# Patient Record
Sex: FEMALE | Race: WHITE | NOT HISPANIC OR LATINO | URBAN - METROPOLITAN AREA
[De-identification: names, ages, dates, MRNs, and addresses within clinical notes are randomized per-mention and may not be internally consistent; named-entity substitution may affect disease eponyms.]

---

## 2018-09-14 ENCOUNTER — INPATIENT (INPATIENT)
Facility: HOSPITAL | Age: 33
LOS: 8 days | Discharge: HOME | End: 2018-09-23
Attending: THORACIC SURGERY (CARDIOTHORACIC VASCULAR SURGERY) | Admitting: THORACIC SURGERY (CARDIOTHORACIC VASCULAR SURGERY)

## 2018-09-14 ENCOUNTER — OUTPATIENT (OUTPATIENT)
Dept: OUTPATIENT SERVICES | Facility: HOSPITAL | Age: 33
LOS: 1 days | Discharge: HOME | End: 2018-09-14

## 2018-09-14 VITALS
SYSTOLIC BLOOD PRESSURE: 137 MMHG | DIASTOLIC BLOOD PRESSURE: 86 MMHG | HEART RATE: 120 BPM | OXYGEN SATURATION: 99 % | RESPIRATION RATE: 20 BRPM | TEMPERATURE: 97 F

## 2018-09-14 DIAGNOSIS — Z98.891 HISTORY OF UTERINE SCAR FROM PREVIOUS SURGERY: Chronic | ICD-10-CM

## 2018-09-14 DIAGNOSIS — R06.9 UNSPECIFIED ABNORMALITIES OF BREATHING: ICD-10-CM

## 2018-09-14 LAB
ALBUMIN SERPL ELPH-MCNC: 4.8 G/DL — SIGNIFICANT CHANGE UP (ref 3.5–5.2)
ALP SERPL-CCNC: 43 U/L — SIGNIFICANT CHANGE UP (ref 30–115)
ALT FLD-CCNC: 10 U/L — SIGNIFICANT CHANGE UP (ref 0–41)
ANION GAP SERPL CALC-SCNC: 17 MMOL/L — HIGH (ref 7–14)
APTT BLD: 41.9 SEC — HIGH (ref 27–39.2)
AST SERPL-CCNC: 32 U/L — SIGNIFICANT CHANGE UP (ref 0–41)
BASE EXCESS BLDV CALC-SCNC: 0.9 MMOL/L — SIGNIFICANT CHANGE UP (ref -2–2)
BASOPHILS # BLD AUTO: 0.04 K/UL — SIGNIFICANT CHANGE UP (ref 0–0.2)
BASOPHILS NFR BLD AUTO: 0.5 % — SIGNIFICANT CHANGE UP (ref 0–1)
BILIRUB SERPL-MCNC: 0.3 MG/DL — SIGNIFICANT CHANGE UP (ref 0.2–1.2)
BLD GP AB SCN SERPL QL: SIGNIFICANT CHANGE UP
BUN SERPL-MCNC: 18 MG/DL — SIGNIFICANT CHANGE UP (ref 10–20)
CA-I SERPL-SCNC: 1.22 MMOL/L — SIGNIFICANT CHANGE UP (ref 1.12–1.3)
CALCIUM SERPL-MCNC: 9 MG/DL — SIGNIFICANT CHANGE UP (ref 8.5–10.1)
CHLORIDE SERPL-SCNC: 99 MMOL/L — SIGNIFICANT CHANGE UP (ref 98–110)
CO2 SERPL-SCNC: 21 MMOL/L — SIGNIFICANT CHANGE UP (ref 17–32)
CREAT SERPL-MCNC: 0.8 MG/DL — SIGNIFICANT CHANGE UP (ref 0.7–1.5)
EOSINOPHIL # BLD AUTO: 0.09 K/UL — SIGNIFICANT CHANGE UP (ref 0–0.7)
EOSINOPHIL NFR BLD AUTO: 1.2 % — SIGNIFICANT CHANGE UP (ref 0–8)
GAS PNL BLDV: 139 MMOL/L — SIGNIFICANT CHANGE UP (ref 136–145)
GAS PNL BLDV: SIGNIFICANT CHANGE UP
GLUCOSE SERPL-MCNC: 99 MG/DL — SIGNIFICANT CHANGE UP (ref 70–99)
HCO3 BLDV-SCNC: 28 MMOL/L — SIGNIFICANT CHANGE UP (ref 22–29)
HCT VFR BLD CALC: 39.6 % — SIGNIFICANT CHANGE UP (ref 37–47)
HCT VFR BLDA CALC: 41.7 % — SIGNIFICANT CHANGE UP (ref 34–44)
HGB BLD CALC-MCNC: 13.6 G/DL — LOW (ref 14–18)
HGB BLD-MCNC: 12.7 G/DL — SIGNIFICANT CHANGE UP (ref 12–16)
IMM GRANULOCYTES NFR BLD AUTO: 0.3 % — SIGNIFICANT CHANGE UP (ref 0.1–0.3)
INR BLD: 1.13 RATIO — SIGNIFICANT CHANGE UP (ref 0.65–1.3)
LACTATE BLDV-MCNC: 0.7 MMOL/L — SIGNIFICANT CHANGE UP (ref 0.5–1.6)
LYMPHOCYTES # BLD AUTO: 2.11 K/UL — SIGNIFICANT CHANGE UP (ref 1.2–3.4)
LYMPHOCYTES # BLD AUTO: 28.8 % — SIGNIFICANT CHANGE UP (ref 20.5–51.1)
MCHC RBC-ENTMCNC: 26.7 PG — LOW (ref 27–31)
MCHC RBC-ENTMCNC: 32.1 G/DL — SIGNIFICANT CHANGE UP (ref 32–37)
MCV RBC AUTO: 83.4 FL — SIGNIFICANT CHANGE UP (ref 81–99)
MONOCYTES # BLD AUTO: 0.56 K/UL — SIGNIFICANT CHANGE UP (ref 0.1–0.6)
MONOCYTES NFR BLD AUTO: 7.6 % — SIGNIFICANT CHANGE UP (ref 1.7–9.3)
NEUTROPHILS # BLD AUTO: 4.51 K/UL — SIGNIFICANT CHANGE UP (ref 1.4–6.5)
NEUTROPHILS NFR BLD AUTO: 61.6 % — SIGNIFICANT CHANGE UP (ref 42.2–75.2)
NRBC # BLD: 0 /100 WBCS — SIGNIFICANT CHANGE UP (ref 0–0)
PCO2 BLDV: 51 MMHG — SIGNIFICANT CHANGE UP (ref 41–51)
PH BLDV: 7.34 — SIGNIFICANT CHANGE UP (ref 7.26–7.43)
PLATELET # BLD AUTO: 262 K/UL — SIGNIFICANT CHANGE UP (ref 130–400)
PO2 BLDV: 18 MMHG — LOW (ref 20–40)
POTASSIUM BLDV-SCNC: 3.9 MMOL/L — SIGNIFICANT CHANGE UP (ref 3.3–5.6)
POTASSIUM SERPL-MCNC: 5.5 MMOL/L — HIGH (ref 3.5–5)
POTASSIUM SERPL-SCNC: 5.5 MMOL/L — HIGH (ref 3.5–5)
PROT SERPL-MCNC: 8.1 G/DL — HIGH (ref 6–8)
PROTHROM AB SERPL-ACNC: 12.2 SEC — SIGNIFICANT CHANGE UP (ref 9.95–12.87)
RBC # BLD: 4.75 M/UL — SIGNIFICANT CHANGE UP (ref 4.2–5.4)
RBC # FLD: 13 % — SIGNIFICANT CHANGE UP (ref 11.5–14.5)
SAO2 % BLDV: 21 % — SIGNIFICANT CHANGE UP
SODIUM SERPL-SCNC: 137 MMOL/L — SIGNIFICANT CHANGE UP (ref 135–146)
TYPE + AB SCN PNL BLD: SIGNIFICANT CHANGE UP
WBC # BLD: 7.33 K/UL — SIGNIFICANT CHANGE UP (ref 4.8–10.8)
WBC # FLD AUTO: 7.33 K/UL — SIGNIFICANT CHANGE UP (ref 4.8–10.8)

## 2018-09-14 RX ORDER — IBUPROFEN 200 MG
400 TABLET ORAL EVERY 6 HOURS
Qty: 0 | Refills: 0 | Status: DISCONTINUED | OUTPATIENT
Start: 2018-09-14 | End: 2018-09-15

## 2018-09-14 RX ORDER — ACETAMINOPHEN 500 MG
650 TABLET ORAL EVERY 6 HOURS
Qty: 0 | Refills: 0 | Status: DISCONTINUED | OUTPATIENT
Start: 2018-09-14 | End: 2018-09-15

## 2018-09-14 RX ORDER — LEVOTHYROXINE SODIUM 125 MCG
100 TABLET ORAL DAILY
Qty: 0 | Refills: 0 | Status: DISCONTINUED | OUTPATIENT
Start: 2018-09-14 | End: 2018-09-18

## 2018-09-14 RX ORDER — CHLORHEXIDINE GLUCONATE 213 G/1000ML
1 SOLUTION TOPICAL
Qty: 0 | Refills: 0 | Status: DISCONTINUED | OUTPATIENT
Start: 2018-09-14 | End: 2018-09-18

## 2018-09-14 RX ORDER — HEPARIN SODIUM 5000 [USP'U]/ML
5000 INJECTION INTRAVENOUS; SUBCUTANEOUS EVERY 8 HOURS
Qty: 0 | Refills: 0 | Status: DISCONTINUED | OUTPATIENT
Start: 2018-09-14 | End: 2018-09-16

## 2018-09-14 RX ORDER — PANTOPRAZOLE SODIUM 20 MG/1
40 TABLET, DELAYED RELEASE ORAL
Qty: 0 | Refills: 0 | Status: DISCONTINUED | OUTPATIENT
Start: 2018-09-14 | End: 2018-09-18

## 2018-09-14 NOTE — ED PROVIDER NOTE - MEDICAL DECISION MAKING DETAILS
33 female here for eval of chest pain found to have outpatient CXR with PTX.  Got labs imaging and consult to Thoracic Surgery will admit for continued management.

## 2018-09-14 NOTE — H&P ADULT - NSHPLABSRESULTS_GEN_ALL_CORE
Labs:                        12.7   7.33  )-----------( 262      ( 14 Sep 2018 20:00 )             39.6       Auto Neutrophil %: 61.6 % (09-14-18 @ 20:00)  Auto Immature Granulocyte %: 0.3 % (09-14-18 @ 20:00)    09-14    137  |  99  |  18  ----------------------------<  99  5.5<H>   |  21  |  0.8      Calcium, Total Serum: 9.0 mg/dL (09-14-18 @ 20:00)      LFTs:             8.1  | 0.3  | 32       ------------------[43      ( 14 Sep 2018 20:00 )  4.8  | x    | 10             Blood Gas Venous - Lactate: 0.7 mmoL/L (09-14-18 @ 19:57)    Coags:     12.20  ----< 1.13    ( 14 Sep 2018 20:00 )     41.9      < from: Xray Chest 2 Views PA/Lat (09.14.18 @ 18:32) >    IMPRESSION:      Large right pneumothorax.    < end of copied text >

## 2018-09-14 NOTE — ED PROVIDER NOTE - ATTENDING CONTRIBUTION TO CARE
I personally evaluated the patient. I reviewed the Resident’s or Physician Assistant’s note (as assigned above), and agree with the findings and plan except as documented in my note.    33 female here for chest pain began abruptly 2 days ago approximately 7pm. Non traumatic. No other injuries. ROS + for exertional dyspnea. Sent to ED by a local [facility] for pneumothorax after abnormal XR. No prior history.      PE: female in no distress. CHEST: decreased breath sounds on the right. bedside POCUS reveals no lung sliding. Normal work of breathing. NECK: normal trachea. CV: pulses intact.     Impression: pneumothorax    Plan: IV labs imaging supportive care consult thoracic surgery tube thoracostomy and admission

## 2018-09-14 NOTE — ED PROVIDER NOTE - OBJECTIVE STATEMENT
33 y.o. F with no PMH of thyroid and c section presents with chest pain since last night. 33 y.o. F with no PMH of thyroid and c section presents with chest pain since last night. She denies trauma, denies drug use. She woke up to chest pain and shortness of breath.

## 2018-09-14 NOTE — ED PROVIDER NOTE - PHYSICAL EXAMINATION
CONSTITUTIONAL: Well-developed; well-nourished; in no acute distress.   SKIN: warm, dry  HEAD: Normocephalic; atraumatic.  NECK: Supple; non tender.  CARD: S1, S2 normal; no murmurs, gallops, or rubs. Regular rate and rhythm.   RESP: No air movement in right side of the lung, No wheezes, rales or rhonchi.  ABD: soft ntnd  EXT: Normal ROM.  No clubbing, cyanosis or edema.   NEURO: Alert, oriented, grossly unremarkable  Psych: Cooperative, able to communicate wishes.

## 2018-09-14 NOTE — H&P ADULT - ASSESSMENT
33 year old female with large ideopathic right pneumothorax now s/p pigtail placed in ED with resolved pneumothorax    Plan  - Admit to Thoracic surgery  - f/u CXR  - Pigtail to suction

## 2018-09-14 NOTE — H&P ADULT - NSHPPHYSICALEXAM_GEN_ALL_CORE
PHYSICAL EXAM:  GENERAL: NAD  HEENT: NCAT  CHEST/LUNG: Diminished breath sounds on right side, no incread WOB,   HEART: Regular rate and rhythm  ABDOMEN: Soft, Nondistended, Nontender, Pelvis stable  EXTREMITIES:  Moving all extremities, pulses throughout, no deformities

## 2018-09-14 NOTE — H&P ADULT - ATTENDING COMMENTS
General Thoracic Surgery Attestation    I have seen and examined the patient.  Where appropriate I have updated, edited, or corrected the resident's or PA's note with regard to findings, values, and plan.    first time presentation of right spontaneous pneumothorax.  will plan to manage conservatively with chest tube.  patient requested small bore pigtail catheter, my recommendation was for formal thoracostomy.  patient is aware that these tubes may need to be replaced with a formal thoracostomy in the future, if unable to maintain total pulmonary expansion, clogging occurs, etc.

## 2018-09-14 NOTE — H&P ADULT - HISTORY OF PRESENT ILLNESS
Patient is a 32 yo female with no PMH presented to the ED with 2 days of chest pain. She states that after dinner last night she was driving and began having chest pain that did not go away. She was able to work her normal shift at work prior to this and had no problems. As the day progressed she began having more SOB as well. She denies any dyspnea, or trauma.

## 2018-09-14 NOTE — ED PROVIDER NOTE - NOTES
Plan discussued, prefers full thoracostomy.  Shared decision making made with explanation of choices by me and Dr. Babb bedside. Risks and benefits of pigtail vs. tube thoracostomy presented to patient.

## 2018-09-14 NOTE — ED PROCEDURE NOTE - PROCEDURE ADDITIONAL DETAILS
Pt tolerated procedure well. On initial Xray the chest tube was in deeper than desired. 8 cm was pulled out and CXR was repeated for proper position.

## 2018-09-14 NOTE — ED PROVIDER NOTE - PROGRESS NOTE DETAILS
patient has been clinically well, on high concentration oxygen and not hypoxic when given room air trial.  Not short of breath.  Anxious regarding procedure and wants to wait for her  to arrive before initiating procedure. Pt opted to for a pigtail

## 2018-09-14 NOTE — ED PROVIDER NOTE - NS ED ROS FT
General: No fevers, no chills  ENMT:  No hearing changes, pain, no sore throat or runny nose, no difficulty swallowing  Cardiac:  No chest pain, SOB or edema. No chest pain with exertion.  Respiratory:  No cough or respiratory distress. No hemoptysis. No history of asthma or RAD.  :  No dysuria, frequency or burning.  MS:  No myalgia, muscle weakness, joint pain or back pain.  Neuro:  No headache or weakness.  No LOC.  Skin:  No skin rash.   Endocrine: No history of thyroid disease or diabetes.

## 2018-09-15 LAB
ANION GAP SERPL CALC-SCNC: 9 MMOL/L — SIGNIFICANT CHANGE UP (ref 7–14)
BASOPHILS # BLD AUTO: 0.03 K/UL — SIGNIFICANT CHANGE UP (ref 0–0.2)
BASOPHILS NFR BLD AUTO: 0.7 % — SIGNIFICANT CHANGE UP (ref 0–1)
BUN SERPL-MCNC: 11 MG/DL — SIGNIFICANT CHANGE UP (ref 10–20)
CALCIUM SERPL-MCNC: 9 MG/DL — SIGNIFICANT CHANGE UP (ref 8.5–10.1)
CHLORIDE SERPL-SCNC: 105 MMOL/L — SIGNIFICANT CHANGE UP (ref 98–110)
CO2 SERPL-SCNC: 26 MMOL/L — SIGNIFICANT CHANGE UP (ref 17–32)
CREAT SERPL-MCNC: 0.7 MG/DL — SIGNIFICANT CHANGE UP (ref 0.7–1.5)
EOSINOPHIL # BLD AUTO: 0.04 K/UL — SIGNIFICANT CHANGE UP (ref 0–0.7)
EOSINOPHIL NFR BLD AUTO: 0.9 % — SIGNIFICANT CHANGE UP (ref 0–8)
GLUCOSE SERPL-MCNC: 96 MG/DL — SIGNIFICANT CHANGE UP (ref 70–99)
HCT VFR BLD CALC: 35.1 % — LOW (ref 37–47)
HGB BLD-MCNC: 11.4 G/DL — LOW (ref 12–16)
IMM GRANULOCYTES NFR BLD AUTO: 0.2 % — SIGNIFICANT CHANGE UP (ref 0.1–0.3)
LYMPHOCYTES # BLD AUTO: 0.85 K/UL — LOW (ref 1.2–3.4)
LYMPHOCYTES # BLD AUTO: 18.7 % — LOW (ref 20.5–51.1)
MAGNESIUM SERPL-MCNC: 2 MG/DL — SIGNIFICANT CHANGE UP (ref 1.8–2.4)
MCHC RBC-ENTMCNC: 27.3 PG — SIGNIFICANT CHANGE UP (ref 27–31)
MCHC RBC-ENTMCNC: 32.5 G/DL — SIGNIFICANT CHANGE UP (ref 32–37)
MCV RBC AUTO: 84 FL — SIGNIFICANT CHANGE UP (ref 81–99)
MONOCYTES # BLD AUTO: 0.31 K/UL — SIGNIFICANT CHANGE UP (ref 0.1–0.6)
MONOCYTES NFR BLD AUTO: 6.8 % — SIGNIFICANT CHANGE UP (ref 1.7–9.3)
NEUTROPHILS # BLD AUTO: 3.3 K/UL — SIGNIFICANT CHANGE UP (ref 1.4–6.5)
NEUTROPHILS NFR BLD AUTO: 72.7 % — SIGNIFICANT CHANGE UP (ref 42.2–75.2)
NRBC # BLD: 0 /100 WBCS — SIGNIFICANT CHANGE UP (ref 0–0)
PHOSPHATE SERPL-MCNC: 2.7 MG/DL — SIGNIFICANT CHANGE UP (ref 2.1–4.9)
PLATELET # BLD AUTO: 201 K/UL — SIGNIFICANT CHANGE UP (ref 130–400)
POTASSIUM SERPL-MCNC: 4.1 MMOL/L — SIGNIFICANT CHANGE UP (ref 3.5–5)
POTASSIUM SERPL-SCNC: 4.1 MMOL/L — SIGNIFICANT CHANGE UP (ref 3.5–5)
RBC # BLD: 4.18 M/UL — LOW (ref 4.2–5.4)
RBC # FLD: 12.9 % — SIGNIFICANT CHANGE UP (ref 11.5–14.5)
SODIUM SERPL-SCNC: 140 MMOL/L — SIGNIFICANT CHANGE UP (ref 135–146)
WBC # BLD: 4.54 K/UL — LOW (ref 4.8–10.8)
WBC # FLD AUTO: 4.54 K/UL — LOW (ref 4.8–10.8)

## 2018-09-15 RX ORDER — ACETAMINOPHEN WITH CODEINE 300MG-30MG
1 TABLET ORAL EVERY 4 HOURS
Qty: 0 | Refills: 0 | Status: DISCONTINUED | OUTPATIENT
Start: 2018-09-15 | End: 2018-09-18

## 2018-09-15 RX ADMIN — Medication 650 MILLIGRAM(S): at 07:38

## 2018-09-15 RX ADMIN — Medication 400 MILLIGRAM(S): at 06:04

## 2018-09-15 RX ADMIN — Medication 1 TABLET(S): at 18:36

## 2018-09-15 RX ADMIN — HEPARIN SODIUM 5000 UNIT(S): 5000 INJECTION INTRAVENOUS; SUBCUTANEOUS at 22:48

## 2018-09-15 RX ADMIN — PANTOPRAZOLE SODIUM 40 MILLIGRAM(S): 20 TABLET, DELAYED RELEASE ORAL at 06:03

## 2018-09-15 RX ADMIN — Medication 400 MILLIGRAM(S): at 06:03

## 2018-09-15 RX ADMIN — Medication 400 MILLIGRAM(S): at 03:54

## 2018-09-15 RX ADMIN — CHLORHEXIDINE GLUCONATE 1 APPLICATION(S): 213 SOLUTION TOPICAL at 06:05

## 2018-09-15 RX ADMIN — Medication 100 MICROGRAM(S): at 07:38

## 2018-09-15 RX ADMIN — Medication 650 MILLIGRAM(S): at 00:48

## 2018-09-15 RX ADMIN — Medication 650 MILLIGRAM(S): at 03:53

## 2018-09-15 RX ADMIN — Medication 1 TABLET(S): at 23:48

## 2018-09-15 RX ADMIN — Medication 650 MILLIGRAM(S): at 06:03

## 2018-09-15 RX ADMIN — Medication 1 TABLET(S): at 23:05

## 2018-09-15 RX ADMIN — HEPARIN SODIUM 5000 UNIT(S): 5000 INJECTION INTRAVENOUS; SUBCUTANEOUS at 06:04

## 2018-09-15 RX ADMIN — Medication 400 MILLIGRAM(S): at 07:38

## 2018-09-15 RX ADMIN — Medication 1 TABLET(S): at 11:41

## 2018-09-15 NOTE — PROGRESS NOTE ADULT - SUBJECTIVE AND OBJECTIVE BOX
GENERAL SURGERY PROGRESS NOTE     EDITH CAPONE  33y  Female  Hospital day :1d  POD:  Procedure: Right pigtail placement  OVERNIGHT EVENTS: No overnight events.    T(F): 97.8 (09-14-18 @ 23:49), Max: 97.8 (09-14-18 @ 23:49)  HR: 92 (09-14-18 @ 23:49) (92 - 120)  BP: 121/76 (09-14-18 @ 23:49) (121/76 - 137/86)  ABP: --  ABP(mean): --  RR: 18 (09-14-18 @ 23:49) (18 - 20)  SpO2: 99% (09-14-18 @ 23:49) (99% - 99%)    GI proph:  pantoprazole    Tablet 40 milliGRAM(s) Oral before breakfast    AC/ proph: heparin  Injectable 5000 Unit(s) SubCutaneous every 8 hours    ABx:   GENERAL: NAD  	HEENT: NCAT  	CHEST/LUNG: Right sided pigtail in place.  	HEART: Regular rate and rhythm  	ABDOMEN: Soft, Nondistended, Nontender, Pelvis stable  EXTREMITIES:  Moving all extremities, pulses throughout, no deformities    LABS  Labs:  CAPILLARY BLOOD GLUCOSE                        12.7   7.33  )-----------( 262      ( 14 Sep 2018 20:00 )             39.6       Auto Neutrophil %: 61.6 % (09-14-18 @ 20:00)  Auto Immature Granulocyte %: 0.3 % (09-14-18 @ 20:00)    09-14    137  |  99  |  18  ----------------------------<  99  5.5<H>   |  21  |  0.8      Calcium, Total Serum: 9.0 mg/dL (09-14-18 @ 20:00)      LFTs:             8.1  | 0.3  | 32       ------------------[43      ( 14 Sep 2018 20:00 )  4.8  | x    | 10          Lipase:x      Amylase:x         Blood Gas Venous - Lactate: 0.7 mmoL/L (09-14-18 @ 19:57)      Coags:     12.20  ----< 1.13    ( 14 Sep 2018 20:00 )     41.9      RADIOLOGY & ADDITIONAL TESTS:    < from: Xray Chest 2 Views PA/Lat (09.14.18 @ 18:32) >  IMPRESSION:      Large rightpneumothorax.        < end of copied text >

## 2018-09-15 NOTE — PROVIDER CONTACT NOTE (OTHER) - SITUATION
pt complaining of pain of R side of chest (insertion of pigtail drain) with tenderness to area with complaints of SOB, VSS BP - 98/963 HR 84 pulse ox 100% on RA, MD came to bedside to assess pt and

## 2018-09-15 NOTE — PROGRESS NOTE ADULT - ASSESSMENT
33 year old female s/p right pigtail placement  -pigtail to suction  -RD. IVL  -Continue home meds.  -Pain control

## 2018-09-16 LAB
ANION GAP SERPL CALC-SCNC: 11 MMOL/L — SIGNIFICANT CHANGE UP (ref 7–14)
BASOPHILS # BLD AUTO: 0.03 K/UL — SIGNIFICANT CHANGE UP (ref 0–0.2)
BASOPHILS NFR BLD AUTO: 0.6 % — SIGNIFICANT CHANGE UP (ref 0–1)
BUN SERPL-MCNC: 13 MG/DL — SIGNIFICANT CHANGE UP (ref 10–20)
CALCIUM SERPL-MCNC: 8.7 MG/DL — SIGNIFICANT CHANGE UP (ref 8.5–10.1)
CHLORIDE SERPL-SCNC: 104 MMOL/L — SIGNIFICANT CHANGE UP (ref 98–110)
CO2 SERPL-SCNC: 24 MMOL/L — SIGNIFICANT CHANGE UP (ref 17–32)
CREAT SERPL-MCNC: 0.7 MG/DL — SIGNIFICANT CHANGE UP (ref 0.7–1.5)
EOSINOPHIL # BLD AUTO: 0.13 K/UL — SIGNIFICANT CHANGE UP (ref 0–0.7)
EOSINOPHIL NFR BLD AUTO: 2.6 % — SIGNIFICANT CHANGE UP (ref 0–8)
GLUCOSE SERPL-MCNC: 91 MG/DL — SIGNIFICANT CHANGE UP (ref 70–99)
HCT VFR BLD CALC: 33.6 % — LOW (ref 37–47)
HGB BLD-MCNC: 10.8 G/DL — LOW (ref 12–16)
IMM GRANULOCYTES NFR BLD AUTO: 0.2 % — SIGNIFICANT CHANGE UP (ref 0.1–0.3)
LYMPHOCYTES # BLD AUTO: 1.91 K/UL — SIGNIFICANT CHANGE UP (ref 1.2–3.4)
LYMPHOCYTES # BLD AUTO: 38.2 % — SIGNIFICANT CHANGE UP (ref 20.5–51.1)
MAGNESIUM SERPL-MCNC: 1.9 MG/DL — SIGNIFICANT CHANGE UP (ref 1.8–2.4)
MCHC RBC-ENTMCNC: 26.9 PG — LOW (ref 27–31)
MCHC RBC-ENTMCNC: 32.1 G/DL — SIGNIFICANT CHANGE UP (ref 32–37)
MCV RBC AUTO: 83.6 FL — SIGNIFICANT CHANGE UP (ref 81–99)
MONOCYTES # BLD AUTO: 0.59 K/UL — SIGNIFICANT CHANGE UP (ref 0.1–0.6)
MONOCYTES NFR BLD AUTO: 11.8 % — HIGH (ref 1.7–9.3)
NEUTROPHILS # BLD AUTO: 2.33 K/UL — SIGNIFICANT CHANGE UP (ref 1.4–6.5)
NEUTROPHILS NFR BLD AUTO: 46.6 % — SIGNIFICANT CHANGE UP (ref 42.2–75.2)
NRBC # BLD: 0 /100 WBCS — SIGNIFICANT CHANGE UP (ref 0–0)
PHOSPHATE SERPL-MCNC: 3.4 MG/DL — SIGNIFICANT CHANGE UP (ref 2.1–4.9)
PLATELET # BLD AUTO: 195 K/UL — SIGNIFICANT CHANGE UP (ref 130–400)
POTASSIUM SERPL-MCNC: 4 MMOL/L — SIGNIFICANT CHANGE UP (ref 3.5–5)
POTASSIUM SERPL-SCNC: 4 MMOL/L — SIGNIFICANT CHANGE UP (ref 3.5–5)
RBC # BLD: 4.02 M/UL — LOW (ref 4.2–5.4)
RBC # FLD: 12.9 % — SIGNIFICANT CHANGE UP (ref 11.5–14.5)
SODIUM SERPL-SCNC: 139 MMOL/L — SIGNIFICANT CHANGE UP (ref 135–146)
WBC # BLD: 5 K/UL — SIGNIFICANT CHANGE UP (ref 4.8–10.8)
WBC # FLD AUTO: 5 K/UL — SIGNIFICANT CHANGE UP (ref 4.8–10.8)

## 2018-09-16 RX ORDER — LIDOCAINE 4 G/100G
1 CREAM TOPICAL DAILY
Qty: 0 | Refills: 0 | Status: DISCONTINUED | OUTPATIENT
Start: 2018-09-16 | End: 2018-09-18

## 2018-09-16 RX ORDER — HEPARIN SODIUM 5000 [USP'U]/ML
5000 INJECTION INTRAVENOUS; SUBCUTANEOUS EVERY 12 HOURS
Qty: 0 | Refills: 0 | Status: DISCONTINUED | OUTPATIENT
Start: 2018-09-16 | End: 2018-09-18

## 2018-09-16 RX ADMIN — Medication 100 MICROGRAM(S): at 06:23

## 2018-09-16 RX ADMIN — HEPARIN SODIUM 5000 UNIT(S): 5000 INJECTION INTRAVENOUS; SUBCUTANEOUS at 06:23

## 2018-09-16 RX ADMIN — Medication 1 TABLET(S): at 06:26

## 2018-09-16 RX ADMIN — Medication 1 TABLET(S): at 07:05

## 2018-09-16 RX ADMIN — CHLORHEXIDINE GLUCONATE 1 APPLICATION(S): 213 SOLUTION TOPICAL at 06:22

## 2018-09-16 NOTE — PROGRESS NOTE ADULT - ASSESSMENT
Assessment:  33y Female patient admitted with spontaneous pneumothorax s/p pigtail placement, with the above physical exam, labs, and imaging findings.    Plan:  -daily CXR to evaluate pigtail placement/pneumothorax  -pain control  -continue regular diet      Date/Time: 09-16-18 @ 04:15

## 2018-09-16 NOTE — CHART NOTE - NSCHARTNOTEFT_GEN_A_CORE
Patient seen and examined, she states she is much more comfortable and her pain has improved drastically today. The right side chest pigtail catheter remains on suction, there is no air leak. Of note this mornings CXR showed a right pneumothorax, the tube was flushed and then an airleak was noted, the PTX resolved on repeat CXR after the tube was flushed. No airleak is seen currently, a repeat CXR was done which was reviewed with Dr. Zamorano and there is no evidence that the PTX has returned. We will repeat a CXR in the AM or sooner if any clinical changes.

## 2018-09-16 NOTE — PROGRESS NOTE ADULT - SUBJECTIVE AND OBJECTIVE BOX
Progress Note: General Surgery  Patient: EDITH CAPONE , 33y (1985)Female   MRN: [id number]  Location: [address]  Visit: 09-14-18 Inpatient  Date: 09-16-18 @ 04:15  Hospital Day: 2  Post-op Day:     Procedure/Diagnosis: spontaneous pneumothorax, s/p pigtail placement  Events over 24h: No acute overnight events.  Patient complained of chest pain on the right side during the afternoon after she ambulated to the bathroom, patient states that it was improving with pain medication.      Vitals: T(F): 98.7 (09-15-18 @ 21:11), Max: 98.7 (09-15-18 @ 21:11)  HR: 82 (09-15-18 @ 21:11)  BP: 114/67 (09-15-18 @ 21:11) (96/57 - 117/56)  RR: 18 (09-15-18 @ 21:11)  SpO2: 100% (09-15-18 @ 18:02)    In:   09-15-18 @ 07:01  -  09-16-18 @ 04:15  --------------------------------------------------------  IN: 0 mL      Out:   09-15-18 @ 07:01  -  09-16-18 @ 04:15  --------------------------------------------------------  OUT:    Voided: 850 mL  Total OUT: 850 mL        Net:   09-15-18 @ 07:01  -  09-16-18 @ 04:15  --------------------------------------------------------  NET: -850 mL      Diet: Diet, Regular (09-14-18 @ 23:32)    IV Fluids: no     Physical Examination:  General Appearance: NAD, alert and cooperative  HEENT: NCAT, WNL  Heart: S1 and S2. No murmurs. Rhythm is regular  Lungs: Clear to auscultation BL, pigtail in place on right side, on wall suction  Abdomen:  Positive bowel sounds. Soft, nondistended, nontender  MSK/Extremities: ROM intact BL upper/lower extremities.     Medications: [Standing]  chlorhexidine 4% Liquid 1 Application(s) Topical <User Schedule>  heparin  Injectable 5000 Unit(s) SubCutaneous every 8 hours  levothyroxine 100 MICROGram(s) Oral daily  pantoprazole    Tablet 40 milliGRAM(s) Oral before breakfast    DVT Prophylaxis: heparin  Injectable 5000 Unit(s) SubCutaneous every 8 hours    GI Prophylaxis: pantoprazole    Tablet 40 milliGRAM(s) Oral before breakfast    Antibiotics:   Anticoagulation:   Medications:[PRN]  acetaminophen 300 mG/codeine 30 mG 1 Tablet(s) Oral every 4 hours PRN    Labs:                        10.8   5.00  )-----------( 195      ( 16 Sep 2018 01:45 )             33.6     09-16    139  |  104  |  13  ----------------------------<  91  4.0   |  24  |  0.7    Ca    8.7      16 Sep 2018 01:45  Phos  3.4     09-16  Mg     1.9     09-16    TPro  8.1<H>  /  Alb  4.8  /  TBili  0.3  /  DBili  x   /  AST  32  /  ALT  10  /  AlkPhos  43  09-14    LIVER FUNCTIONS - ( 14 Sep 2018 20:00 )  Alb: 4.8 g/dL / Pro: 8.1 g/dL / ALK PHOS: 43 U/L / ALT: 10 U/L / AST: 32 U/L / GGT: x           PT/INR - ( 14 Sep 2018 20:00 )   PT: 12.20 sec;   INR: 1.13 ratio         PTT - ( 14 Sep 2018 20:00 )  PTT:41.9 sec        Urine/Micro:      Imaging:  < from: Xray Chest 1 View- PORTABLE-Routine (09.15.18 @ 09:03) >  Impression:    Improved examination, with possible trace residual right apical   pneumothorax.    < end of copied text >

## 2018-09-17 DIAGNOSIS — Z02.9 ENCOUNTER FOR ADMINISTRATIVE EXAMINATIONS, UNSPECIFIED: ICD-10-CM

## 2018-09-17 LAB
ANION GAP SERPL CALC-SCNC: 13 MMOL/L — SIGNIFICANT CHANGE UP (ref 7–14)
APTT BLD: 35.4 SEC — SIGNIFICANT CHANGE UP (ref 27–39.2)
BASOPHILS # BLD AUTO: 0.05 K/UL — SIGNIFICANT CHANGE UP (ref 0–0.2)
BASOPHILS NFR BLD AUTO: 0.7 % — SIGNIFICANT CHANGE UP (ref 0–1)
BUN SERPL-MCNC: 13 MG/DL — SIGNIFICANT CHANGE UP (ref 10–20)
CALCIUM SERPL-MCNC: 9.9 MG/DL — SIGNIFICANT CHANGE UP (ref 8.5–10.1)
CHLORIDE SERPL-SCNC: 101 MMOL/L — SIGNIFICANT CHANGE UP (ref 98–110)
CO2 SERPL-SCNC: 25 MMOL/L — SIGNIFICANT CHANGE UP (ref 17–32)
CREAT SERPL-MCNC: 0.6 MG/DL — LOW (ref 0.7–1.5)
EOSINOPHIL # BLD AUTO: 0.16 K/UL — SIGNIFICANT CHANGE UP (ref 0–0.7)
EOSINOPHIL NFR BLD AUTO: 2.4 % — SIGNIFICANT CHANGE UP (ref 0–8)
GLUCOSE SERPL-MCNC: 98 MG/DL — SIGNIFICANT CHANGE UP (ref 70–99)
HCT VFR BLD CALC: 37.7 % — SIGNIFICANT CHANGE UP (ref 37–47)
HCT VFR BLD CALC: 40.2 % — SIGNIFICANT CHANGE UP (ref 37–47)
HGB BLD-MCNC: 12.3 G/DL — SIGNIFICANT CHANGE UP (ref 12–16)
HGB BLD-MCNC: 13.2 G/DL — SIGNIFICANT CHANGE UP (ref 12–16)
IMM GRANULOCYTES NFR BLD AUTO: 0.1 % — SIGNIFICANT CHANGE UP (ref 0.1–0.3)
INR BLD: 1.15 RATIO — SIGNIFICANT CHANGE UP (ref 0.65–1.3)
LYMPHOCYTES # BLD AUTO: 2.15 K/UL — SIGNIFICANT CHANGE UP (ref 1.2–3.4)
LYMPHOCYTES # BLD AUTO: 32.1 % — SIGNIFICANT CHANGE UP (ref 20.5–51.1)
MAGNESIUM SERPL-MCNC: 2 MG/DL — SIGNIFICANT CHANGE UP (ref 1.8–2.4)
MCHC RBC-ENTMCNC: 27.4 PG — SIGNIFICANT CHANGE UP (ref 27–31)
MCHC RBC-ENTMCNC: 27.5 PG — SIGNIFICANT CHANGE UP (ref 27–31)
MCHC RBC-ENTMCNC: 32.6 G/DL — SIGNIFICANT CHANGE UP (ref 32–37)
MCHC RBC-ENTMCNC: 32.8 G/DL — SIGNIFICANT CHANGE UP (ref 32–37)
MCV RBC AUTO: 83.6 FL — SIGNIFICANT CHANGE UP (ref 81–99)
MCV RBC AUTO: 84.2 FL — SIGNIFICANT CHANGE UP (ref 81–99)
MONOCYTES # BLD AUTO: 0.62 K/UL — HIGH (ref 0.1–0.6)
MONOCYTES NFR BLD AUTO: 9.3 % — SIGNIFICANT CHANGE UP (ref 1.7–9.3)
NEUTROPHILS # BLD AUTO: 3.71 K/UL — SIGNIFICANT CHANGE UP (ref 1.4–6.5)
NEUTROPHILS NFR BLD AUTO: 55.4 % — SIGNIFICANT CHANGE UP (ref 42.2–75.2)
NRBC # BLD: 0 /100 WBCS — SIGNIFICANT CHANGE UP (ref 0–0)
NRBC # BLD: 0 /100 WBCS — SIGNIFICANT CHANGE UP (ref 0–0)
PHOSPHATE SERPL-MCNC: 3.4 MG/DL — SIGNIFICANT CHANGE UP (ref 2.1–4.9)
PLATELET # BLD AUTO: 273 K/UL — SIGNIFICANT CHANGE UP (ref 130–400)
PLATELET # BLD AUTO: 280 K/UL — SIGNIFICANT CHANGE UP (ref 130–400)
POTASSIUM SERPL-MCNC: 4.5 MMOL/L — SIGNIFICANT CHANGE UP (ref 3.5–5)
POTASSIUM SERPL-SCNC: 4.5 MMOL/L — SIGNIFICANT CHANGE UP (ref 3.5–5)
PROTHROM AB SERPL-ACNC: 12.4 SEC — SIGNIFICANT CHANGE UP (ref 9.95–12.87)
RBC # BLD: 4.48 M/UL — SIGNIFICANT CHANGE UP (ref 4.2–5.4)
RBC # BLD: 4.81 M/UL — SIGNIFICANT CHANGE UP (ref 4.2–5.4)
RBC # FLD: 12.8 % — SIGNIFICANT CHANGE UP (ref 11.5–14.5)
RBC # FLD: 12.8 % — SIGNIFICANT CHANGE UP (ref 11.5–14.5)
SODIUM SERPL-SCNC: 139 MMOL/L — SIGNIFICANT CHANGE UP (ref 135–146)
WBC # BLD: 5.34 K/UL — SIGNIFICANT CHANGE UP (ref 4.8–10.8)
WBC # BLD: 6.7 K/UL — SIGNIFICANT CHANGE UP (ref 4.8–10.8)
WBC # FLD AUTO: 5.34 K/UL — SIGNIFICANT CHANGE UP (ref 4.8–10.8)
WBC # FLD AUTO: 6.7 K/UL — SIGNIFICANT CHANGE UP (ref 4.8–10.8)

## 2018-09-17 RX ORDER — SODIUM CHLORIDE 9 MG/ML
1000 INJECTION INTRAMUSCULAR; INTRAVENOUS; SUBCUTANEOUS
Qty: 0 | Refills: 0 | Status: DISCONTINUED | OUTPATIENT
Start: 2018-09-18 | End: 2018-09-18

## 2018-09-17 RX ADMIN — Medication 100 MICROGRAM(S): at 05:45

## 2018-09-17 RX ADMIN — CHLORHEXIDINE GLUCONATE 1 APPLICATION(S): 213 SOLUTION TOPICAL at 05:26

## 2018-09-17 NOTE — PROGRESS NOTE ADULT - SUBJECTIVE AND OBJECTIVE BOX
Progress Note: General Surgery  Patient: EDITH CAPONE , 33y (1985)Female   MRN: [id number]  Location: [address]  Visit: 09-14-18 Inpatient  Date: 09-17-18 @ 06:15  Hospital Day: 3    Procedure/Diagnosis: S/p right sided pigtail placement  Events over 24h: NO acute overnight events. Patient seen and examined bedside. Has right sided back pain, ongoing, states it feels like "lung pain". Chest tube in place, no air leak, to suction.     Vitals: T(F): 96.5 (09-17-18 @ 05:39), Max: 98.7 (09-16-18 @ 21:41)  HR: 70 (09-17-18 @ 05:39)  BP: 93/70 (09-17-18 @ 05:39) (93/70 - 109/70)  RR: 18 (09-17-18 @ 05:39)  SpO2: 98% (09-17-18 @ 00:39)    In:   09-15-18 @ 07:01  -  09-16-18 @ 07:00  --------------------------------------------------------  IN: 0 mL    09-16-18 @ 07:01  -  09-17-18 @ 06:15  --------------------------------------------------------  IN: 0 mL      Out:   09-15-18 @ 07:01  -  09-16-18 @ 07:00  --------------------------------------------------------  OUT:    Voided: 850 mL  Total OUT: 850 mL    09-16-18 @ 07:01  -  09-17-18 @ 06:15  --------------------------------------------------------  OUT:    Chest Tube: 5 mL    Voided: 1050 mL  Total OUT: 1055 mL    Net:   09-15-18 @ 07:01  -  09-16-18 @ 07:00  --------------------------------------------------------  NET: -850 mL    09-16-18 @ 07:01  -  09-17-18 @ 06:15  --------------------------------------------------------  NET: -1055 mL    Diet: Diet, Regular (09-14-18 @ 23:32)      Physical Examination:  General Appearance: NAD, alert and cooperative  HEENT: NCAT, WNL  Heart: S1 and S2. No murmurs. Rhythm is regular  Lungs: Clear to auscultation BL, pigtail in place on right side, on wall suction  Abdomen:  Positive bowel sounds. Soft, nondistended, nontender  MSK/Extremities: ROM intact BL upper/lower extremities.     Medications: [Standing]  chlorhexidine 4% Liquid 1 Application(s) Topical <User Schedule>  heparin  Injectable 5000 Unit(s) SubCutaneous every 12 hours  levothyroxine 100 MICROGram(s) Oral daily  lidocaine   Patch 1 Patch Transdermal daily  pantoprazole    Tablet 40 milliGRAM(s) Oral before breakfast    DVT Prophylaxis: heparin  Injectable 5000 Unit(s) SubCutaneous every 12 hours  GI Prophylaxis: pantoprazole    Tablet 40 milliGRAM(s) Oral before breakfast    Antibiotics:   Anticoagulation:   Medications:[PRN]  acetaminophen 300 mG/codeine 30 mG 1 Tablet(s) Oral every 4 hours PRN    Labs:                        12.3   6.70  )-----------( 273      ( 17 Sep 2018 00:08 )             37.7     09-17    139  |  101  |  13  ----------------------------<  98  4.5   |  25  |  0.6<L>    Ca    9.9      17 Sep 2018 00:08  Phos  3.4     09-17  Mg     2.0     09-17    Imaging:  Pending CXR AM.     Assessment:  33y Female patient admitted S/P right sided pigtail placement.       Plan:  -daily CXR  -pain control  -continue regular diet      Date/Time: 09-17-18 @ 06:15

## 2018-09-18 ENCOUNTER — RESULT REVIEW (OUTPATIENT)
Age: 33
End: 2018-09-18

## 2018-09-18 LAB
ANION GAP SERPL CALC-SCNC: 16 MMOL/L — HIGH (ref 7–14)
BLD GP AB SCN SERPL QL: SIGNIFICANT CHANGE UP
BUN SERPL-MCNC: 10 MG/DL — SIGNIFICANT CHANGE UP (ref 10–20)
CALCIUM SERPL-MCNC: 9.6 MG/DL — SIGNIFICANT CHANGE UP (ref 8.5–10.1)
CHLORIDE SERPL-SCNC: 99 MMOL/L — SIGNIFICANT CHANGE UP (ref 98–110)
CO2 SERPL-SCNC: 24 MMOL/L — SIGNIFICANT CHANGE UP (ref 17–32)
CREAT SERPL-MCNC: 0.6 MG/DL — LOW (ref 0.7–1.5)
GLUCOSE SERPL-MCNC: 100 MG/DL — HIGH (ref 70–99)
HCG SERPL-ACNC: <0.6 MIU/ML — SIGNIFICANT CHANGE UP
POTASSIUM SERPL-MCNC: 4.5 MMOL/L — SIGNIFICANT CHANGE UP (ref 3.5–5)
POTASSIUM SERPL-SCNC: 4.5 MMOL/L — SIGNIFICANT CHANGE UP (ref 3.5–5)
SODIUM SERPL-SCNC: 139 MMOL/L — SIGNIFICANT CHANGE UP (ref 135–146)
TYPE + AB SCN PNL BLD: SIGNIFICANT CHANGE UP

## 2018-09-18 RX ORDER — ONDANSETRON 8 MG/1
4 TABLET, FILM COATED ORAL EVERY 6 HOURS
Qty: 0 | Refills: 0 | Status: DISCONTINUED | OUTPATIENT
Start: 2018-09-18 | End: 2018-09-23

## 2018-09-18 RX ORDER — SODIUM CHLORIDE 9 MG/ML
1000 INJECTION, SOLUTION INTRAVENOUS
Qty: 0 | Refills: 0 | Status: DISCONTINUED | OUTPATIENT
Start: 2018-09-18 | End: 2018-09-20

## 2018-09-18 RX ORDER — LEVOTHYROXINE SODIUM 125 MCG
100 TABLET ORAL DAILY
Qty: 0 | Refills: 0 | Status: DISCONTINUED | OUTPATIENT
Start: 2018-09-18 | End: 2018-09-23

## 2018-09-18 RX ORDER — PANTOPRAZOLE SODIUM 20 MG/1
40 TABLET, DELAYED RELEASE ORAL
Qty: 0 | Refills: 0 | Status: DISCONTINUED | OUTPATIENT
Start: 2018-09-18 | End: 2018-09-23

## 2018-09-18 RX ORDER — MORPHINE SULFATE 50 MG/1
30 CAPSULE, EXTENDED RELEASE ORAL
Qty: 0 | Refills: 0 | Status: DISCONTINUED | OUTPATIENT
Start: 2018-09-18 | End: 2018-09-18

## 2018-09-18 RX ORDER — MORPHINE SULFATE 50 MG/1
30 CAPSULE, EXTENDED RELEASE ORAL
Qty: 0 | Refills: 0 | Status: DISCONTINUED | OUTPATIENT
Start: 2018-09-18 | End: 2018-09-20

## 2018-09-18 RX ORDER — SODIUM CHLORIDE 9 MG/ML
1000 INJECTION INTRAMUSCULAR; INTRAVENOUS; SUBCUTANEOUS
Qty: 0 | Refills: 0 | Status: DISCONTINUED | OUTPATIENT
Start: 2018-09-18 | End: 2018-09-18

## 2018-09-18 RX ORDER — BUPIVACAINE 13.3 MG/ML
20 INJECTION, SUSPENSION, LIPOSOMAL INFILTRATION ONCE
Qty: 0 | Refills: 0 | Status: DISCONTINUED | OUTPATIENT
Start: 2018-09-18 | End: 2018-09-18

## 2018-09-18 RX ORDER — CHLORHEXIDINE GLUCONATE 213 G/1000ML
1 SOLUTION TOPICAL
Qty: 0 | Refills: 0 | Status: DISCONTINUED | OUTPATIENT
Start: 2018-09-18 | End: 2018-09-23

## 2018-09-18 RX ORDER — ONDANSETRON 8 MG/1
4 TABLET, FILM COATED ORAL EVERY 6 HOURS
Qty: 0 | Refills: 0 | Status: DISCONTINUED | OUTPATIENT
Start: 2018-09-18 | End: 2018-09-18

## 2018-09-18 RX ORDER — NALOXONE HYDROCHLORIDE 4 MG/.1ML
0.1 SPRAY NASAL
Qty: 0 | Refills: 0 | Status: DISCONTINUED | OUTPATIENT
Start: 2018-09-18 | End: 2018-09-18

## 2018-09-18 RX ORDER — ACETAMINOPHEN 500 MG
650 TABLET ORAL EVERY 6 HOURS
Qty: 0 | Refills: 0 | Status: DISCONTINUED | OUTPATIENT
Start: 2018-09-18 | End: 2018-09-19

## 2018-09-18 RX ORDER — NALOXONE HYDROCHLORIDE 4 MG/.1ML
0.1 SPRAY NASAL
Qty: 0 | Refills: 0 | Status: DISCONTINUED | OUTPATIENT
Start: 2018-09-18 | End: 2018-09-23

## 2018-09-18 RX ORDER — HEPARIN SODIUM 5000 [USP'U]/ML
5000 INJECTION INTRAVENOUS; SUBCUTANEOUS EVERY 12 HOURS
Qty: 0 | Refills: 0 | Status: DISCONTINUED | OUTPATIENT
Start: 2018-09-18 | End: 2018-09-23

## 2018-09-18 RX ADMIN — SODIUM CHLORIDE 50 MILLILITER(S): 9 INJECTION, SOLUTION INTRAVENOUS at 19:00

## 2018-09-18 RX ADMIN — Medication 100 MICROGRAM(S): at 06:36

## 2018-09-18 RX ADMIN — ONDANSETRON 4 MILLIGRAM(S): 8 TABLET, FILM COATED ORAL at 19:15

## 2018-09-18 RX ADMIN — Medication 100 MILLIGRAM(S): at 21:37

## 2018-09-18 RX ADMIN — HEPARIN SODIUM 5000 UNIT(S): 5000 INJECTION INTRAVENOUS; SUBCUTANEOUS at 21:00

## 2018-09-18 RX ADMIN — MORPHINE SULFATE 30 MILLILITER(S): 50 CAPSULE, EXTENDED RELEASE ORAL at 19:05

## 2018-09-18 RX ADMIN — SODIUM CHLORIDE 75 MILLILITER(S): 9 INJECTION INTRAMUSCULAR; INTRAVENOUS; SUBCUTANEOUS at 03:20

## 2018-09-18 NOTE — PROGRESS NOTE ADULT - ASSESSMENT
follow up daily chest xray  follow up chest tube output  trial of waterseal today   preopped if fails waterseal trial

## 2018-09-18 NOTE — PROGRESS NOTE ADULT - ASSESSMENT
Plan and assessment  Pt. 33y Female status post Thoracoscopy right vats wedge time 2, mechanical pleurodesis     -CHEST TUBE TO SUCTION AT ALL TIMES  -Pain managment pca pump  -sub hep q 12 hr  -abx  - Diet  -monitor chest tube outpt  -daily chest xray    NICA Swartz   09-18-18 @ 21:01  # [id number]/ [id number]

## 2018-09-18 NOTE — PROGRESS NOTE ADULT - SUBJECTIVE AND OBJECTIVE BOX
Hospital Day: 3  Post Operative Day:  Procedure:  Patient is a 33y old  Female who presents with a chief complaint of Pneumothorax (17 Sep 2018 06:14)    PAST MEDICAL & SURGICAL HISTORY:  Hypothyroid  H/O: : x2      Events of the Last 24h: patient complained of pain by the right side of the chest by pigtail after moving, chest xray obtained and looks negative as per radiology  Vital Signs Last 24 Hrs  T(C): 35.7 (18 Sep 2018 05:18), Max: 36.6 (17 Sep 2018 13:10)  T(F): 96.2 (18 Sep 2018 05:18), Max: 97.8 (17 Sep 2018 13:10)  HR: 74 (18 Sep 2018 05:18) (70 - 90)  BP: 91/54 (18 Sep 2018 05:18) (91/54 - 100/60)  BP(mean): --  RR: 18 (18 Sep 2018 05:18) (18 - 20)  SpO2: --        Diet, Regular (18 @ 23:32)  Diet, NPO after Midnight:      NPO Start Date: 17-Sep-2018,   NPO Start Time: 23:59 (18 @ 13:42)      I&O's Summary    16 Sep 2018 07:  -  17 Sep 2018 07:00  --------------------------------------------------------  IN: 0 mL / OUT: 1055 mL / NET: -1055 mL     I&O's Detail    16 Sep 2018 07:  -  17 Sep 2018 07:00  --------------------------------------------------------  IN:  Total IN: 0 mL    OUT:    Chest Tube: 5 mL    Voided: 1050 mL  Total OUT: 1055 mL    Total NET: -1055 mL          MEDICATIONS  (STANDING):  chlorhexidine 4% Liquid 1 Application(s) Topical <User Schedule>  heparin  Injectable 5000 Unit(s) SubCutaneous every 12 hours  levothyroxine 100 MICROGram(s) Oral daily  lidocaine   Patch 1 Patch Transdermal daily  pantoprazole    Tablet 40 milliGRAM(s) Oral before breakfast  sodium chloride 0.9%. 1000 milliLiter(s) (75 mL/Hr) IV Continuous <Continuous>    MEDICATIONS  (PRN):  acetaminophen 300 mG/codeine 30 mG 1 Tablet(s) Oral every 4 hours PRN Moderate Pain (4 - 6)      PHYSICAL EXAM:    GENERAL: NAD    HEENT: NCAT    CHEST/LUNGS: CTAB, pigtail to suction right chest sutured in place    HEART: RRR,  No murmurs, rubs, or gallops    ABDOMEN: SNTND +BS    EXTREMITIES:  FROM, No clubbing, cyanosis, or edema, palpable pulse    NEURO: No focal neurological deficits    SKIN: No rashes or lesions    INCISION/WOUNDS:                        13.2   5.34  )-----------( 280      (  @ 21:23 )             40.2                12.3   6.70  )-----------( 273      (  @ 00:08 )             37.7                    139   |  99    |  10                 Ca: 9.6    BMP:   ----------------------------< 100    Mg: x     (18 @ 21:23)             4.5    |  24    | 0.6                Ph: x        LFT:     TPro: 8.1 / Alb: 4.8 / TBili: 0.3 / DBili: x / AST: 32 / ALT: 10 / AlkPhos: 43   (18 @ 20:00)          PT/INR - ( 17 Sep 2018 21:23 )   PT: 12.40 sec;   INR: 1.15 ratio         PTT - ( 17 Sep 2018 21:23 )  PTT:35.4 sec                                   13.2   5.34  )-----------( 280      ( 17 Sep 2018 21:23 )             40.2        CBC Full  -  ( 17 Sep 2018 21:23 )  WBC Count : 5.34 K/uL  Hemoglobin : 13.2 g/dL  Hematocrit : 40.2 %  Platelet Count - Automated : 280 K/uL  Mean Cell Volume : 83.6 fL  Mean Cell Hemoglobin : 27.4 pg  Mean Cell Hemoglobin Concentration : 32.8 g/dL  Auto Neutrophil # : x  Auto Lymphocyte # : x  Auto Monocyte # : x  Auto Eosinophil # : x  Auto Basophil # : x  Auto Neutrophil % : x  Auto Lymphocyte % : x  Auto Monocyte % : x  Auto Eosinophil % : x  Auto Basophil % : x               139   |  99    |  10                 Ca: 9.6    BMP:   ----------------------------< 100    Mg: x     (18 @ 21:23)             4.5    |  24    | 0.6                Ph: x        LFT:     TPro: 8.1 / Alb: 4.8 / TBili: 0.3 / DBili: x / AST: 32 / ALT: 10 / AlkPhos: 43   (18 @ 20:00)      PT/INR - ( 17 Sep 2018 21:23 )   PT: 12.40 sec;   INR: 1.15 ratio         PTT - ( 17 Sep 2018 21:23 )  PTT:35.4 sec

## 2018-09-18 NOTE — PROGRESS NOTE ADULT - SUBJECTIVE AND OBJECTIVE BOX
Vascular Surgery Post Operative Note    EDITH CAPONE,33yFemale  [id number]  09-18-18 @ 21:01  Procedure: Status post     Thoracoscopy right vats wedge time 2, mechanical pleurodesis   Post Operative day #0      Findings:  multiple sub cm blebs.  apex actually relatively spared.  right upper lobe fissural surface with dominant 3cm bleb      Patient resting comfortably complaing of mild upper right chest discomfort      T(C): 36.7 (09-18-18 @ 19:35), Max: 36.7 (09-18-18 @ 19:35)  HR: 787 (09-18-18 @ 20:34) (74 - 787)  BP: 104/64 (09-18-18 @ 20:34) (91/54 - 120/72)  RR: 18 (09-18-18 @ 20:34) (16 - 22)  SpO2: 100% (09-18-18 @ 20:34) (98% - 100%)    09-17-18 @ 07:01  -  09-18-18 @ 07:00  --------------------------------------------------------  IN: 0 mL / OUT: 5 mL / NET: -5 mL    09-18-18 @ 07:01  -  09-18-18 @ 21:01  --------------------------------------------------------  IN: 0 mL / OUT: 300 mL / NET: -300 mL        General:Alert and oriented times 3, not in acute distress   Heart: Regular rate and rhythm, no rubs , murmurs or gallops  Lungs: Clear to auscultation bilaterally, no wheezes, rales, rhonci appreciated  Chest: incision clean dry intact, right sided chest tube in place on suction sero sang drainage no airleak   Abdomen: Soft , positive bowel sounds, no tenderness, no distention, no peritoneal signs               13.2   5.34  )-----------( 280      ( 09-17 @ 21:23 )             40.2                12.3   6.70  )-----------( 273      ( 09-17 @ 00:08 )             37.7                    139   |  99    |  10                 Ca: 9.6    BMP:   ----------------------------< 100    Mg: x     (09-17-18 @ 21:23)             4.5    |  24    | 0.6                Ph: x        LFT:     TPro: 8.1 / Alb: 4.8 / TBili: 0.3 / DBili: x / AST: 32 / ALT: 10 / AlkPhos: 43   (09-14-18 @ 20:00)          PT/INR - ( 17 Sep 2018 21:23 )   PT: 12.40 sec;   INR: 1.15 ratio         PTT - ( 17 Sep 2018 21:23 )  PTT:35.4 sec              cxr done in pacu :shows apex right sided chest tube no ptx

## 2018-09-18 NOTE — BRIEF OPERATIVE NOTE - OPERATION/FINDINGS
multiple sub cm blebs.  apex actually relatively spared.  right upper lobe fissural surface with dominant 3cm bleb

## 2018-09-18 NOTE — BRIEF OPERATIVE NOTE - PROCEDURE
<<-----Click on this checkbox to enter Procedure Thoracoscopy  09/18/2018  right vats wedge time 2, mechanical pleurodesis  Active  LUANN

## 2018-09-19 RX ORDER — ACETAMINOPHEN 500 MG
650 TABLET ORAL EVERY 6 HOURS
Qty: 0 | Refills: 0 | Status: DISCONTINUED | OUTPATIENT
Start: 2018-09-19 | End: 2018-09-20

## 2018-09-19 RX ORDER — ACETAMINOPHEN 500 MG
1000 TABLET ORAL ONCE
Qty: 0 | Refills: 0 | Status: COMPLETED | OUTPATIENT
Start: 2018-09-19 | End: 2018-09-19

## 2018-09-19 RX ADMIN — Medication 650 MILLIGRAM(S): at 17:21

## 2018-09-19 RX ADMIN — Medication 100 MILLIGRAM(S): at 06:31

## 2018-09-19 RX ADMIN — Medication 100 MICROGRAM(S): at 06:32

## 2018-09-19 RX ADMIN — Medication 400 MILLIGRAM(S): at 22:44

## 2018-09-19 RX ADMIN — ONDANSETRON 4 MILLIGRAM(S): 8 TABLET, FILM COATED ORAL at 02:00

## 2018-09-19 RX ADMIN — PANTOPRAZOLE SODIUM 40 MILLIGRAM(S): 20 TABLET, DELAYED RELEASE ORAL at 06:32

## 2018-09-19 RX ADMIN — Medication 100 MILLIGRAM(S): at 13:31

## 2018-09-19 RX ADMIN — Medication 650 MILLIGRAM(S): at 18:42

## 2018-09-19 RX ADMIN — HEPARIN SODIUM 5000 UNIT(S): 5000 INJECTION INTRAVENOUS; SUBCUTANEOUS at 17:23

## 2018-09-19 NOTE — PROGRESS NOTE ADULT - SUBJECTIVE AND OBJECTIVE BOX
Progress Note: Trauma Surgery  Patient: EDITH CAPONE , 33y (1985)Female   MRN: [id number]  Location: [address]  Visit: 09-14-18 Inpatient  Date: 09-19-18 @ 05:30    Hospital Day: 6  Post-op Day: 1    Procedure/Injury:  Right vats wedge x2 with mechanical pleurodesis   Events over 24h: surgery went well multiple sub cm blebs apex actually relatively spared right upper lobe fissural surface with dominant 3cm bleb. POC was done and patient is doing well with a PCA pump. patient requesting extension tubing so she can attempt to get up to go to bathroom.     Vitals: T(F): 96.9 (09-19-18 @ 04:48), Max: 98 (09-18-18 @ 19:35)  HR: 68 (09-19-18 @ 04:48)  BP: 94/53 (09-19-18 @ 04:48) (94/53 - 120/72)  RR: 18 (09-19-18 @ 04:48)  SpO2: 99% (09-18-18 @ 22:00)    Diet: Diet, Regular (09-18-18 @ 18:36)      Bowel function:   Bowel movement []   Flatus []      In:   09-17-18 @ 07:01  -  09-18-18 @ 07:00  --------------------------------------------------------  IN: 0 mL    09-18-18 @ 07:01  -  09-19-18 @ 05:30  --------------------------------------------------------  IN: 0 mL      Out:   09-17-18 @ 07:01  -  09-18-18 @ 07:00  --------------------------------------------------------  OUT:    Chest Tube: 5 mL  Total OUT: 5 mL      09-18-18 @ 07:01  -  09-19-18 @ 05:30  --------------------------------------------------------  OUT:    Voided: 300 mL  Total OUT: 300 mL        Net:   09-17-18 @ 07:01  -  09-18-18 @ 07:00  --------------------------------------------------------  NET: -5 mL    09-18-18 @ 07:01  -  09-19-18 @ 05:30  --------------------------------------------------------  NET: -300 mL          Physical Examination:  General: NAD, lying in bed comfortably   HEENT: NCAT, LAVONNE, WNL  Heart: S1 S2, No MRG RRR   Lungs: CTABL +BS Equal BL, right sided chest tube with no air leak  Abdomen: SNDNT. Obese. No guarding or rebound tenderness.   MSK/Extremities: ROM intact BL UE/LE. Normal gait. No significant deformity or joint abnormality.   Skin: Warm/dry, Normal color, No jaundice.   Incisions/Wounds: Dressings in place, clean, dry and intact, no signs of infection/active bleeding/drainage    Medications: [Standing]  chlorhexidine 4% Liquid 1 Application(s) Topical <User Schedule>  clindamycin IVPB 600 milliGRAM(s) IV Intermittent every 8 hours  dextrose 5% + sodium chloride 0.45%. 1000 milliLiter(s) (50 mL/Hr) IV Continuous <Continuous>  heparin  Injectable 5000 Unit(s) SubCutaneous every 12 hours  levothyroxine 100 MICROGram(s) Oral daily  morphine PCA (5 mG/mL) 30 milliLiter(s) PCA Continuous PCA Continuous  pantoprazole    Tablet 40 milliGRAM(s) Oral before breakfast    Medications:[PRN]  acetaminophen   Tablet .. 650 milliGRAM(s) Oral every 6 hours PRN  naloxone Injectable 0.1 milliGRAM(s) IV Push every 3 minutes PRN  ondansetron Injectable 4 milliGRAM(s) IV Push every 6 hours PRN    Labs:                        13.2   5.34  )-----------( 280      ( 17 Sep 2018 21:23 )             40.2     09-17    139  |  99  |  10  ----------------------------<  100<H>  4.5   |  24  |  0.6<L>    Ca    9.6      17 Sep 2018 21:23        PT/INR - ( 17 Sep 2018 21:23 )   PT: 12.40 sec;   INR: 1.15 ratio    PTT - ( 17 Sep 2018 21:23 )  PTT:35.4 sec    Imaging:  < from: Xray Chest 1 View- PORTABLE-Routine (09.18.18 @ 11:33) >  Impression:      Right-sided pneumothorax, reaccumulated.    < end of copied text >      Assessment:  33y Female patient admitted S/P Right vats wedge x2 with mechanical pleurodesis       Plan:  - Pain control with PCA  - F/u CXR  - OOB with extension tubing   - CT to suction at all times    Dispo:  Seen and evaluated by PT: Yes [] No []  Physiatry reccomendations: Home with VNS [  ] , SNF/STR [  ] , Inpatient rehab [  ] , No needs + D/C home [  ]  SW:     Date/Time: 09-19-18 @ 05:30

## 2018-09-19 NOTE — PROGRESS NOTE ADULT - SUBJECTIVE AND OBJECTIVE BOX
The patient is wide awake, vitally stable, no postop nausea or vomiting and her pain is controlled with the current medication.

## 2018-09-20 LAB
A1AT SERPL-MCNC: 184 MG/DL — SIGNIFICANT CHANGE UP (ref 90–200)
ANION GAP SERPL CALC-SCNC: 12 MMOL/L — SIGNIFICANT CHANGE UP (ref 7–14)
BASOPHILS # BLD AUTO: 0.02 K/UL — SIGNIFICANT CHANGE UP (ref 0–0.2)
BASOPHILS NFR BLD AUTO: 0.3 % — SIGNIFICANT CHANGE UP (ref 0–1)
BUN SERPL-MCNC: 7 MG/DL — LOW (ref 10–20)
CALCIUM SERPL-MCNC: 9.2 MG/DL — SIGNIFICANT CHANGE UP (ref 8.5–10.1)
CHLORIDE SERPL-SCNC: 100 MMOL/L — SIGNIFICANT CHANGE UP (ref 98–110)
CO2 SERPL-SCNC: 28 MMOL/L — SIGNIFICANT CHANGE UP (ref 17–32)
CREAT SERPL-MCNC: 0.6 MG/DL — LOW (ref 0.7–1.5)
EOSINOPHIL # BLD AUTO: 0.05 K/UL — SIGNIFICANT CHANGE UP (ref 0–0.7)
EOSINOPHIL NFR BLD AUTO: 0.8 % — SIGNIFICANT CHANGE UP (ref 0–8)
GLUCOSE SERPL-MCNC: 107 MG/DL — HIGH (ref 70–99)
HCT VFR BLD CALC: 37.5 % — SIGNIFICANT CHANGE UP (ref 37–47)
HGB BLD-MCNC: 11.9 G/DL — LOW (ref 12–16)
IMM GRANULOCYTES NFR BLD AUTO: 0.2 % — SIGNIFICANT CHANGE UP (ref 0.1–0.3)
LYMPHOCYTES # BLD AUTO: 1.73 K/UL — SIGNIFICANT CHANGE UP (ref 1.2–3.4)
LYMPHOCYTES # BLD AUTO: 27.4 % — SIGNIFICANT CHANGE UP (ref 20.5–51.1)
MAGNESIUM SERPL-MCNC: 2 MG/DL — SIGNIFICANT CHANGE UP (ref 1.8–2.4)
MCHC RBC-ENTMCNC: 26.9 PG — LOW (ref 27–31)
MCHC RBC-ENTMCNC: 31.7 G/DL — LOW (ref 32–37)
MCV RBC AUTO: 84.8 FL — SIGNIFICANT CHANGE UP (ref 81–99)
MONOCYTES # BLD AUTO: 0.73 K/UL — HIGH (ref 0.1–0.6)
MONOCYTES NFR BLD AUTO: 11.6 % — HIGH (ref 1.7–9.3)
NEUTROPHILS # BLD AUTO: 3.78 K/UL — SIGNIFICANT CHANGE UP (ref 1.4–6.5)
NEUTROPHILS NFR BLD AUTO: 59.7 % — SIGNIFICANT CHANGE UP (ref 42.2–75.2)
NRBC # BLD: 0 /100 WBCS — SIGNIFICANT CHANGE UP (ref 0–0)
PHOSPHATE SERPL-MCNC: 3.2 MG/DL — SIGNIFICANT CHANGE UP (ref 2.1–4.9)
PLATELET # BLD AUTO: 266 K/UL — SIGNIFICANT CHANGE UP (ref 130–400)
POTASSIUM SERPL-MCNC: 4.5 MMOL/L — SIGNIFICANT CHANGE UP (ref 3.5–5)
POTASSIUM SERPL-SCNC: 4.5 MMOL/L — SIGNIFICANT CHANGE UP (ref 3.5–5)
RBC # BLD: 4.42 M/UL — SIGNIFICANT CHANGE UP (ref 4.2–5.4)
RBC # FLD: 13 % — SIGNIFICANT CHANGE UP (ref 11.5–14.5)
SODIUM SERPL-SCNC: 140 MMOL/L — SIGNIFICANT CHANGE UP (ref 135–146)
SURGICAL PATHOLOGY STUDY: SIGNIFICANT CHANGE UP
WBC # BLD: 6.32 K/UL — SIGNIFICANT CHANGE UP (ref 4.8–10.8)
WBC # FLD AUTO: 6.32 K/UL — SIGNIFICANT CHANGE UP (ref 4.8–10.8)

## 2018-09-20 RX ORDER — MORPHINE SULFATE 50 MG/1
1 CAPSULE, EXTENDED RELEASE ORAL ONCE
Qty: 0 | Refills: 0 | Status: DISCONTINUED | OUTPATIENT
Start: 2018-09-20 | End: 2018-09-20

## 2018-09-20 RX ORDER — ACETAMINOPHEN WITH CODEINE 300MG-30MG
1 TABLET ORAL EVERY 4 HOURS
Qty: 0 | Refills: 0 | Status: DISCONTINUED | OUTPATIENT
Start: 2018-09-20 | End: 2018-09-23

## 2018-09-20 RX ORDER — DOCUSATE SODIUM 100 MG
100 CAPSULE ORAL
Qty: 0 | Refills: 0 | Status: DISCONTINUED | OUTPATIENT
Start: 2018-09-20 | End: 2018-09-23

## 2018-09-20 RX ORDER — KETOROLAC TROMETHAMINE 30 MG/ML
15 SYRINGE (ML) INJECTION EVERY 6 HOURS
Qty: 0 | Refills: 0 | Status: DISCONTINUED | OUTPATIENT
Start: 2018-09-20 | End: 2018-09-23

## 2018-09-20 RX ORDER — SENNA PLUS 8.6 MG/1
1 TABLET ORAL DAILY
Qty: 0 | Refills: 0 | Status: DISCONTINUED | OUTPATIENT
Start: 2018-09-20 | End: 2018-09-23

## 2018-09-20 RX ADMIN — Medication 650 MILLIGRAM(S): at 06:22

## 2018-09-20 RX ADMIN — HEPARIN SODIUM 5000 UNIT(S): 5000 INJECTION INTRAVENOUS; SUBCUTANEOUS at 17:13

## 2018-09-20 RX ADMIN — MORPHINE SULFATE 1 MILLIGRAM(S): 50 CAPSULE, EXTENDED RELEASE ORAL at 22:15

## 2018-09-20 RX ADMIN — Medication 1 TABLET(S): at 15:28

## 2018-09-20 RX ADMIN — Medication 1 TABLET(S): at 20:10

## 2018-09-20 RX ADMIN — Medication 15 MILLIGRAM(S): at 19:42

## 2018-09-20 RX ADMIN — Medication 1000 MILLIGRAM(S): at 00:27

## 2018-09-20 RX ADMIN — Medication 1 TABLET(S): at 10:30

## 2018-09-20 RX ADMIN — Medication 100 MICROGRAM(S): at 05:13

## 2018-09-20 RX ADMIN — Medication 100 MILLIGRAM(S): at 18:36

## 2018-09-20 RX ADMIN — Medication 1 TABLET(S): at 23:52

## 2018-09-20 RX ADMIN — MORPHINE SULFATE 1 MILLIGRAM(S): 50 CAPSULE, EXTENDED RELEASE ORAL at 22:45

## 2018-09-20 RX ADMIN — Medication 15 MILLIGRAM(S): at 20:10

## 2018-09-20 RX ADMIN — HEPARIN SODIUM 5000 UNIT(S): 5000 INJECTION INTRAVENOUS; SUBCUTANEOUS at 05:13

## 2018-09-20 RX ADMIN — SENNA PLUS 1 TABLET(S): 8.6 TABLET ORAL at 18:36

## 2018-09-20 RX ADMIN — Medication 1 TABLET(S): at 19:13

## 2018-09-20 RX ADMIN — Medication 15 MILLIGRAM(S): at 23:52

## 2018-09-20 RX ADMIN — Medication 1 TABLET(S): at 10:05

## 2018-09-20 RX ADMIN — Medication 650 MILLIGRAM(S): at 05:12

## 2018-09-20 RX ADMIN — Medication 1 TABLET(S): at 15:05

## 2018-09-20 NOTE — PROGRESS NOTE ADULT - SUBJECTIVE AND OBJECTIVE BOX
Progress Note: Trauma Surgery  Patient: EDITH CAPONE , 33y (1985)Female   MRN: [id number]  Location: [address]  Visit: 09-14-18 Inpatient  Date: 09-20-18 @ 05:25    Hospital Day: 7  Post-op Day: 2    Procedure/Injury: s/p Right vats wedge x2 with mechanical pleurodesis     Events over 24h: Patient complaining of chest pain yesterday. CXR normal. CT still to suction no air leak. Patient got IV tylenol x1 dose and is now on standing tylenol. Patient only requiring PCA 3 times over past 12 hours. Alpha 1 antitrypsin sent with last labs     Vitals: T(F): 96.7 (09-19-18 @ 20:15), Max: 97 (09-19-18 @ 13:54)  HR: 73 (09-19-18 @ 20:15)  BP: 103/59 (09-19-18 @ 20:15) (100/62 - 103/59)  RR: 16 (09-19-18 @ 20:15)  SpO2: 99% (09-19-18 @ 23:13)    Diet: Diet, Regular (09-18-18 @ 18:36)      Bowel function:   Bowel movement []   Flatus []      In:   09-18-18 @ 07:01  -  09-19-18 @ 07:00  --------------------------------------------------------  IN: 0 mL    09-19-18 @ 07:01  -  09-20-18 @ 05:25  --------------------------------------------------------  IN: 0 mL      Out:   09-18-18 @ 07:01  -  09-19-18 @ 07:00  --------------------------------------------------------  OUT:    Chest Tube: 100 mL    Voided: 300 mL  Total OUT: 400 mL      09-19-18 @ 07:01  -  09-20-18 @ 05:25  --------------------------------------------------------  OUT:    Chest Tube: 240 mL    Voided: 1175 mL  Total OUT: 1415 mL        Net:   09-18-18 @ 07:01  -  09-19-18 @ 07:00  --------------------------------------------------------  NET: -400 mL    09-19-18 @ 07:01  -  09-20-18 @ 05:25  --------------------------------------------------------  NET: -1415 mL          Physical Examination:  General: NAD, lying in bed comfortably   HEENT: NCAT, LAVONNE, WNL  Heart: S1 S2, No MRG RRR   Lungs: CTABL +BS Equal BL, right sided chest tube in place, to suction with no air leak   Abdomen: SNDNT. Obese. No guarding or rebound tenderness.   MSK/Extremities: ROM intact BL UE/LE. Normal gait. No significant deformity or joint abnormality.   Skin: Warm/dry, Normal color, No jaundice.   Incisions/Wounds: Dressings in place, clean, dry and intact, no signs of infection/active bleeding/drainage    Medications: [Standing]  acetaminophen   Tablet .. 650 milliGRAM(s) Oral every 6 hours  chlorhexidine 4% Liquid 1 Application(s) Topical <User Schedule>  dextrose 5% + sodium chloride 0.45%. 1000 milliLiter(s) (50 mL/Hr) IV Continuous <Continuous>  heparin  Injectable 5000 Unit(s) SubCutaneous every 12 hours  levothyroxine 100 MICROGram(s) Oral daily  morphine PCA (5 mG/mL) 30 milliLiter(s) PCA Continuous PCA Continuous  pantoprazole    Tablet 40 milliGRAM(s) Oral before breakfast    Medications:[PRN]  diazepam  Injectable 2.5 milliGRAM(s) IV Push every 6 hours PRN  naloxone Injectable 0.1 milliGRAM(s) IV Push every 3 minutes PRN  ondansetron Injectable 4 milliGRAM(s) IV Push every 6 hours PRN    Labs:                        11.9   6.32  )-----------( 266      ( 20 Sep 2018 02:01 )             37.5     09-20    140  |  100  |  7<L>  ----------------------------<  107<H>  4.5   |  28  |  0.6<L>    Ca    9.2      20 Sep 2018 02:01  Phos  3.2     09-20  Mg     2.0     09-20    Imaging:  < from: Xray Chest 1 View-PORTABLE IMMEDIATE (09.19.18 @ 11:58) >  IMPRESSION:     Stable right apically right chest tube. No pneumothorax.    < end of copied text >      Assessment:  33y Female patient admitted S/P Right vats wedge x2 with mechanical pleurodesis       Plan:  - Pain control   - attempt to wean PCA today  - Regular diet   - Chest tube stays to suction   - OOB with tube on suction   - standing tylenol with PRN valium      Dispo:  Seen and evaluated by PT: Yes [] No []  Physiatry reccomendations: Home with VNS [  ] , SNF/STR [  ] , Inpatient rehab [  ] , No needs + D/C home [  ]  SW:     Date/Time: 09-20-18 @ 05:25

## 2018-09-20 NOTE — CONSULT NOTE ADULT - SUBJECTIVE AND OBJECTIVE BOX
Patient is a 33y old  Female who presents with a chief complaint of Pneumothorax (20 Sep 2018 05:24)      HPI:  Patient is a 32 yo female with no PMH presented to the ED with 2 days of chest pain. She states that after dinner last night she was driving and began having chest pain that did not go away. She was able to work her normal shift at work prior to this and had no problems. As the day progressed she began having more SOB as well. She denies any dyspnea, or trauma. (14 Sep 2018 23:08)      PAST MEDICAL & SURGICAL HISTORY:  Hypothyroid  H/O: : x2      SOCIAL HX:   Smoking                         ETOH                            Other    FAMILY HISTORY:  .  No cardiovascular or pulmonary family history     Review of System:  See HPI    Allergies    Keflex (Unknown)    Intolerances          PHYSICAL EXAM  Vital Signs Last 24 Hrs  T(C): 35.6 (20 Sep 2018 05:36), Max: 36.1 (19 Sep 2018 13:54)  T(F): 96.1 (20 Sep 2018 05:36), Max: 97 (19 Sep 2018 13:54)  HR: 75 (20 Sep 2018 05:36) (73 - 80)  BP: 95/54 (20 Sep 2018 05:36) (95/54 - 103/59)  BP(mean): --  RR: 17 (20 Sep 2018 05:36) (16 - 17)  SpO2: 98% (20 Sep 2018 05:36) (98% - 100%)    General: In NAD  HEENT: LAVONNE             Lymphatic system: No cervical LN     Lungs: [place] BS  Cardiovascular: Regular  Gastrointestinal: Soft.  + BS   Musculoskeletal: No Clubbing.  Full range of motion.. Moves all extremities  Skin: Warm.  Intact  Neurological: No motor or sensory deficit       LABS:                          11.9   6.32  )-----------( 266      ( 20 Sep 2018 02:01 )             37.5                                               09-20    140  |  100  |  7<L>  ----------------------------<  107<H>  4.5   |  28  |  0.6<L>    Ca    9.2      20 Sep 2018 02:01  Phos  3.2     09-20  Mg     2.0     09-20                                                                                                                                                                                    MEDICATIONS  (STANDING):  acetaminophen   Tablet .. 650 milliGRAM(s) Oral every 6 hours  chlorhexidine 4% Liquid 1 Application(s) Topical <User Schedule>  dextrose 5% + sodium chloride 0.45%. 1000 milliLiter(s) (50 mL/Hr) IV Continuous <Continuous>  heparin  Injectable 5000 Unit(s) SubCutaneous every 12 hours  levothyroxine 100 MICROGram(s) Oral daily  morphine PCA (5 mG/mL) 30 milliLiter(s) PCA Continuous PCA Continuous  pantoprazole    Tablet 40 milliGRAM(s) Oral before breakfast    MEDICATIONS  (PRN):  diazepam  Injectable 2.5 milliGRAM(s) IV Push every 6 hours PRN chest wall discomfort  naloxone Injectable 0.1 milliGRAM(s) IV Push every 3 minutes PRN For ANY of the following changes in patient status:  A. RR LESS THAN 10 breaths per minute, B. Oxygen saturation LESS THAN 90%, C. Sedation score of 6  ondansetron Injectable 4 milliGRAM(s) IV Push every 6 hours PRN Nausea Patient is a 33y old  Female who presents with a chief complaint of Pneumothorax (20 Sep 2018 05:24)      HPI:    34 yo F with hypothyroidism, works as a pharmacist, never smoker, presents to ED after urgent care found a pneumothorax on cxr. She was complaining of intermittent pleuritic chest pain for the past 1-2 months, off and on not occuring every day. The 2 days PTP she started having sudden, sharp constant right sided pleuritic chest pain that is made worse on exertion and a/w dyspnea. no fever, chills, weight changes, IVDA, high risk sexual activity, never happened before, no trauma, no family history of lung disease. She was admitted to [facility] and right pgtail catheter was placed. Pneumo resolved initially then recurred, chest tube was replaced, then the pneumo recurred again. Patient is now post Op day 2 from VATS with wedge resection.    Suction was turned off this morning, repeat cxr does not show ptx. Patient complains of pleuritic chest pain and difficilty breathing.          PAST MEDICAL & SURGICAL HISTORY:  Hypothyroid  H/O: : x2      SOCIAL HX:   Smoking   never                      ETOH  occasional                          Other none    FAMILY HISTORY:  .  No cardiovascular or pulmonary family history     Review of System:  See HPI    Allergies    Keflex (Unknown)    Intolerances          PHYSICAL EXAM  Vital Signs Last 24 Hrs  T(C): 35.6 (20 Sep 2018 05:36), Max: 36.1 (19 Sep 2018 13:54)  T(F): 96.1 (20 Sep 2018 05:36), Max: 97 (19 Sep 2018 13:54)  HR: 75 (20 Sep 2018 05:36) (73 - 80)  BP: 95/54 (20 Sep 2018 05:36) (95/54 - 103/59)  BP(mean): --  RR: 17 (20 Sep 2018 05:36) (16 - 17)  SpO2: 98% (20 Sep 2018 05:36) (98% - 100%)    General: In NAD  HEENT: LAVONNE             Lymphatic system: No cervical LN     Lungs: GAE b/l, no wheeze no crackles, right sided chest tube in place to gravity, suction off  Cardiovascular: Regular  Gastrointestinal: Soft.  + BS   Musculoskeletal: No Clubbing.  Full range of motion.. Moves all extremities  Skin: Warm.  Intact  Neurological: No motor or sensory deficit       LABS:                          11.9   6.32  )-----------( 266      ( 20 Sep 2018 02:01 )             37.5                                               09-20    140  |  100  |  7<L>  ----------------------------<  107<H>  4.5   |  28  |  0.6<L>    Ca    9.2      20 Sep 2018 02:01  Phos  3.2     09-20  Mg     2.0     09-20                                                                                                                                                                                    MEDICATIONS  (STANDING):  acetaminophen   Tablet .. 650 milliGRAM(s) Oral every 6 hours  chlorhexidine 4% Liquid 1 Application(s) Topical <User Schedule>  dextrose 5% + sodium chloride 0.45%. 1000 milliLiter(s) (50 mL/Hr) IV Continuous <Continuous>  heparin  Injectable 5000 Unit(s) SubCutaneous every 12 hours  levothyroxine 100 MICROGram(s) Oral daily  morphine PCA (5 mG/mL) 30 milliLiter(s) PCA Continuous PCA Continuous  pantoprazole    Tablet 40 milliGRAM(s) Oral before breakfast    MEDICATIONS  (PRN):  diazepam  Injectable 2.5 milliGRAM(s) IV Push every 6 hours PRN chest wall discomfort  naloxone Injectable 0.1 milliGRAM(s) IV Push every 3 minutes PRN For ANY of the following changes in patient status:  A. RR LESS THAN 10 breaths per minute, B. Oxygen saturation LESS THAN 90%, C. Sedation score of 6  ondansetron Injectable 4 milliGRAM(s) IV Push every 6 hours PRN Nausea

## 2018-09-20 NOTE — CONSULT NOTE ADULT - ASSESSMENT
IMPRESSION:      PLAN: IMPRESSION:    Primary Spontaneous Pneumothorax s/p VATS with wedge and mechanical pleurdesis    PLAN:    - CT chest noncontrast to evaluate for diffuse cystic lung disease  - chest tube management per ct surgery  - outpatient pulmonary follow up IMPRESSION:    Primary Spontaneous Pneumothorax s/p VATS with wedge and mechanical pleurdesis    PLAN:    FU with CTS  OOB to chair  Chest tube management per CT surgery  Outpatient pulmonary follow up

## 2018-09-21 LAB
ANION GAP SERPL CALC-SCNC: 14 MMOL/L — SIGNIFICANT CHANGE UP (ref 7–14)
BASOPHILS # BLD AUTO: 0.04 K/UL — SIGNIFICANT CHANGE UP (ref 0–0.2)
BASOPHILS NFR BLD AUTO: 0.7 % — SIGNIFICANT CHANGE UP (ref 0–1)
BUN SERPL-MCNC: 9 MG/DL — LOW (ref 10–20)
CALCIUM SERPL-MCNC: 9.3 MG/DL — SIGNIFICANT CHANGE UP (ref 8.5–10.1)
CHLORIDE SERPL-SCNC: 101 MMOL/L — SIGNIFICANT CHANGE UP (ref 98–110)
CO2 SERPL-SCNC: 24 MMOL/L — SIGNIFICANT CHANGE UP (ref 17–32)
CREAT SERPL-MCNC: 0.6 MG/DL — LOW (ref 0.7–1.5)
EOSINOPHIL # BLD AUTO: 0.07 K/UL — SIGNIFICANT CHANGE UP (ref 0–0.7)
EOSINOPHIL NFR BLD AUTO: 1.2 % — SIGNIFICANT CHANGE UP (ref 0–8)
GLUCOSE SERPL-MCNC: 100 MG/DL — HIGH (ref 70–99)
HCT VFR BLD CALC: 34.4 % — LOW (ref 37–47)
HGB BLD-MCNC: 11 G/DL — LOW (ref 12–16)
IMM GRANULOCYTES NFR BLD AUTO: 0.2 % — SIGNIFICANT CHANGE UP (ref 0.1–0.3)
LYMPHOCYTES # BLD AUTO: 1.06 K/UL — LOW (ref 1.2–3.4)
LYMPHOCYTES # BLD AUTO: 18.3 % — LOW (ref 20.5–51.1)
MAGNESIUM SERPL-MCNC: 1.9 MG/DL — SIGNIFICANT CHANGE UP (ref 1.8–2.4)
MCHC RBC-ENTMCNC: 27.2 PG — SIGNIFICANT CHANGE UP (ref 27–31)
MCHC RBC-ENTMCNC: 32 G/DL — SIGNIFICANT CHANGE UP (ref 32–37)
MCV RBC AUTO: 84.9 FL — SIGNIFICANT CHANGE UP (ref 81–99)
MONOCYTES # BLD AUTO: 0.8 K/UL — HIGH (ref 0.1–0.6)
MONOCYTES NFR BLD AUTO: 13.8 % — HIGH (ref 1.7–9.3)
NEUTROPHILS # BLD AUTO: 3.81 K/UL — SIGNIFICANT CHANGE UP (ref 1.4–6.5)
NEUTROPHILS NFR BLD AUTO: 65.8 % — SIGNIFICANT CHANGE UP (ref 42.2–75.2)
NRBC # BLD: 0 /100 WBCS — SIGNIFICANT CHANGE UP (ref 0–0)
PHOSPHATE SERPL-MCNC: 3.5 MG/DL — SIGNIFICANT CHANGE UP (ref 2.1–4.9)
PLATELET # BLD AUTO: 218 K/UL — SIGNIFICANT CHANGE UP (ref 130–400)
POTASSIUM SERPL-MCNC: 4.5 MMOL/L — SIGNIFICANT CHANGE UP (ref 3.5–5)
POTASSIUM SERPL-SCNC: 4.5 MMOL/L — SIGNIFICANT CHANGE UP (ref 3.5–5)
RBC # BLD: 4.05 M/UL — LOW (ref 4.2–5.4)
RBC # FLD: 13 % — SIGNIFICANT CHANGE UP (ref 11.5–14.5)
SODIUM SERPL-SCNC: 139 MMOL/L — SIGNIFICANT CHANGE UP (ref 135–146)
WBC # BLD: 5.79 K/UL — SIGNIFICANT CHANGE UP (ref 4.8–10.8)
WBC # FLD AUTO: 5.79 K/UL — SIGNIFICANT CHANGE UP (ref 4.8–10.8)

## 2018-09-21 RX ORDER — LACTULOSE 10 G/15ML
15 SOLUTION ORAL DAILY
Qty: 0 | Refills: 0 | Status: DISCONTINUED | OUTPATIENT
Start: 2018-09-21 | End: 2018-09-23

## 2018-09-21 RX ORDER — MORPHINE SULFATE 50 MG/1
1 CAPSULE, EXTENDED RELEASE ORAL ONCE
Qty: 0 | Refills: 0 | Status: DISCONTINUED | OUTPATIENT
Start: 2018-09-21 | End: 2018-09-21

## 2018-09-21 RX ORDER — DIAZEPAM 5 MG
2 TABLET ORAL EVERY 8 HOURS
Qty: 0 | Refills: 0 | Status: DISCONTINUED | OUTPATIENT
Start: 2018-09-21 | End: 2018-09-23

## 2018-09-21 RX ADMIN — Medication 100 MICROGRAM(S): at 05:18

## 2018-09-21 RX ADMIN — Medication 1 TABLET(S): at 05:18

## 2018-09-21 RX ADMIN — HEPARIN SODIUM 5000 UNIT(S): 5000 INJECTION INTRAVENOUS; SUBCUTANEOUS at 05:20

## 2018-09-21 RX ADMIN — Medication 15 MILLIGRAM(S): at 11:39

## 2018-09-21 RX ADMIN — Medication 15 MILLIGRAM(S): at 23:00

## 2018-09-21 RX ADMIN — Medication 1 TABLET(S): at 00:30

## 2018-09-21 RX ADMIN — Medication 1 TABLET(S): at 06:02

## 2018-09-21 RX ADMIN — Medication 10 MILLIGRAM(S): at 17:13

## 2018-09-21 RX ADMIN — Medication 15 MILLIGRAM(S): at 05:18

## 2018-09-21 RX ADMIN — Medication 100 MILLIGRAM(S): at 05:18

## 2018-09-21 RX ADMIN — MORPHINE SULFATE 1 MILLIGRAM(S): 50 CAPSULE, EXTENDED RELEASE ORAL at 01:05

## 2018-09-21 RX ADMIN — Medication 15 MILLIGRAM(S): at 19:04

## 2018-09-21 RX ADMIN — Medication 15 MILLIGRAM(S): at 11:33

## 2018-09-21 RX ADMIN — MORPHINE SULFATE 1 MILLIGRAM(S): 50 CAPSULE, EXTENDED RELEASE ORAL at 01:40

## 2018-09-21 RX ADMIN — SENNA PLUS 1 TABLET(S): 8.6 TABLET ORAL at 11:39

## 2018-09-21 RX ADMIN — Medication 100 MILLIGRAM(S): at 17:13

## 2018-09-21 RX ADMIN — HEPARIN SODIUM 5000 UNIT(S): 5000 INJECTION INTRAVENOUS; SUBCUTANEOUS at 17:13

## 2018-09-21 RX ADMIN — Medication 15 MILLIGRAM(S): at 17:11

## 2018-09-21 RX ADMIN — Medication 2 MILLIGRAM(S): at 17:11

## 2018-09-21 RX ADMIN — Medication 15 MILLIGRAM(S): at 00:30

## 2018-09-21 RX ADMIN — Medication 15 MILLIGRAM(S): at 06:02

## 2018-09-21 RX ADMIN — LACTULOSE 15 GRAM(S): 10 SOLUTION ORAL at 12:29

## 2018-09-21 NOTE — DIETITIAN INITIAL EVALUATION ADULT. - OTHER INFO
Pt p/w chest pain with primary dx of pneumothorax on right. Hospital course complicated by Primary Spontaneous Pneumothorax s/p VATS with wedge and mechanical pleurdesis 9/18. R Chest tube in place. Reason for assessment: LOS.

## 2018-09-21 NOTE — DIETITIAN INITIAL EVALUATION ADULT. - DIET TYPE
regular/Pt reports adequate appetite and PO intake since admission. Recc DASH/TLC diet s/p procedure, discussed with pt at bedside. no further d/c instruction at this time

## 2018-09-21 NOTE — DIETITIAN INITIAL EVALUATION ADULT. - ENERGY NEEDS
total kcal = ~9506-7572 kcal/day (MSJ x 1.2-1.3).   total protein = 63-75 g/day (1.0-1.2 g/kg CBW).   total fluid = per LIP

## 2018-09-21 NOTE — DIETITIAN INITIAL EVALUATION ADULT. - FACTORS AFF FOOD INTAKE
denies difficulty chewing/swallowing. c/o constipation x3 days likely d/t pain medication. stool softeners in place, lactulose added today.

## 2018-09-21 NOTE — PROGRESS NOTE ADULT - ATTENDING COMMENTS
POD 2 after VATS pleurodesis/lung resection  doing well  pain is better controlled with CT off suction  toradol started  IV PCA switched to PO T#3  Patient ambulates  still reports pain, but better controlled  plan on d/c'ing the CT tomorrow  probably needs CT of the chest prior to d/c or as an outpatient
POD 3 after VATS pleurodesis/lung resection  doing well  pain is better controlled with CT on suction  pain control is better  will check CXR tomorrow  if OK, will d/c chest tube and d/c patient home
General Thoracic Surgery Attestation    I have seen and examined the patient.  Where appropriate I have updated, edited, or corrected the resident's or PA's note with regard to findings, values, and plan.    patient with continued presentation with no leak in a.m. and lung up but continued reaccumulation of large pneumothorax with waterseal.  we discussed options of upsizing to formal thoracostomy tube vs vats.  given we have achieved good pulmonary expansion with pigtail up to this point, I do not think larger bore chest tube would provide any additional evacuation of air and improved pleural apposition.  Plan for vats, likely wedge resection of leaking bleb and mechanical pleurodesis.  Significant time was spent this a.m. and afternoon, as we have done on all previous days, answering all of the patients question.  risks and benefits of the proposed plan and alternative plans were discussed at length with the patient.

## 2018-09-21 NOTE — PROGRESS NOTE ADULT - SUBJECTIVE AND OBJECTIVE BOX
Progress Note: General Surgery  Patient: EDITH CAPONE , 33y (1985)Female   MRN: [id number]  Location: [address]  Visit: 09-14-18 Inpatient  Date: 09-21-18 @ 05:44  Hospital Day: 8  Post-op Day:3     Procedure/Injury: s/p Right vats wedge x2 with mechanical pleurodesis   Events over 24h: Patient complaining of chest pain overnight. CXR normal. CT still to suction no air leak.     Vitals: T(F): 97.5 (09-20-18 @ 20:25), Max: 97.9 (09-20-18 @ 14:04)  HR: 80 (09-20-18 @ 20:25)  BP: 116/63 (09-20-18 @ 20:25) (100/60 - 116/63)  RR: 18 (09-20-18 @ 20:25)  SpO2: 97% (09-20-18 @ 20:16)    In:   09-19-18 @ 07:01  -  09-20-18 @ 07:00  --------------------------------------------------------  IN: 0 mL    09-20-18 @ 07:01  -  09-21-18 @ 05:44  --------------------------------------------------------  IN: 0 mL      Out:   09-19-18 @ 07:01  -  09-20-18 @ 07:00  --------------------------------------------------------  OUT:    Chest Tube: 260 mL    Voided: 1175 mL  Total OUT: 1435 mL      09-20-18 @ 07:01  -  09-21-18 @ 05:44  --------------------------------------------------------  OUT:    Chest Tube: 20 mL    Voided: 520 mL  Total OUT: 540 mL      Net:   09-19-18 @ 07:01  -  09-20-18 @ 07:00  --------------------------------------------------------  NET: -1435 mL    09-20-18 @ 07:01  -  09-21-18 @ 05:44  --------------------------------------------------------  NET: -540 mL      Diet: Diet, Regular (09-18-18 @ 18:36)    Physical Examination:  General Appearance: NAD, alert and cooperative  HEENT: NCAT, WNL  Heart: S1 and S2. No murmurs. Rhythm is regular irregular.   Lungs: Chest tube in place, CTABL  Abdomen:  Positive bowel sounds. Soft, nondistended    Medications: [Standing]  chlorhexidine 4% Liquid 1 Application(s) Topical <User Schedule>  docusate sodium 100 milliGRAM(s) Oral two times a day  heparin  Injectable 5000 Unit(s) SubCutaneous every 12 hours  ketorolac   Injectable 15 milliGRAM(s) IV Push every 6 hours  levothyroxine 100 MICROGram(s) Oral daily  pantoprazole    Tablet 40 milliGRAM(s) Oral before breakfast  senna 1 Tablet(s) Oral daily    DVT Prophylaxis: heparin  Injectable 5000 Unit(s) SubCutaneous every 12 hours  GI Prophylaxis: pantoprazole    Tablet 40 milliGRAM(s) Oral before breakfast    Antibiotics:   Anticoagulation:   Medications:[PRN]  acetaminophen 300 mG/codeine 30 mG 1 Tablet(s) Oral every 4 hours PRN  diazepam  Injectable 2.5 milliGRAM(s) IV Push every 6 hours PRN  naloxone Injectable 0.1 milliGRAM(s) IV Push every 3 minutes PRN  ondansetron Injectable 4 milliGRAM(s) IV Push every 6 hours PRN    Labs:                        11.0   5.79  )-----------( 218      ( 21 Sep 2018 02:29 )             34.4     09-21    139  |  101  |  9<L>  ----------------------------<  100<H>  4.5   |  24  |  0.6<L>    Ca    9.3      21 Sep 2018 02:29  Phos  3.5     09-21  Mg     1.9     09-21    Imaging:  < from: Xray Chest 1 View-PORTABLE IMMEDIATE (09.20.18 @ 14:39) >  Impression:      Right-sided chest tube, no pneumothorax, unchanged.    < end of copied text >    Assessment:  33y Female patient admitted S/P right VATS 2 wedge resections mechanical pleurodesis 9/18    Plan:  Ambulate  ICS  Pain control  F/u AM CXR  Possible d/c CT today    Date/Time: 09-21-18 @ 05:44

## 2018-09-21 NOTE — DIETITIAN INITIAL EVALUATION ADULT. - ORAL INTAKE PTA
good/Pt reports adequate appetite and PO intake 3 meals/day + snacks PTA. Denies food allergies and use of nutrition supplements.

## 2018-09-22 RX ADMIN — HEPARIN SODIUM 5000 UNIT(S): 5000 INJECTION INTRAVENOUS; SUBCUTANEOUS at 17:52

## 2018-09-22 RX ADMIN — Medication 15 MILLIGRAM(S): at 00:41

## 2018-09-22 RX ADMIN — Medication 15 MILLIGRAM(S): at 16:40

## 2018-09-22 RX ADMIN — Medication 15 MILLIGRAM(S): at 05:02

## 2018-09-22 RX ADMIN — Medication 100 MILLIGRAM(S): at 05:03

## 2018-09-22 RX ADMIN — Medication 100 MICROGRAM(S): at 05:03

## 2018-09-22 RX ADMIN — HEPARIN SODIUM 5000 UNIT(S): 5000 INJECTION INTRAVENOUS; SUBCUTANEOUS at 05:03

## 2018-09-22 RX ADMIN — Medication 15 MILLIGRAM(S): at 06:41

## 2018-09-22 RX ADMIN — Medication 100 MILLIGRAM(S): at 17:52

## 2018-09-22 NOTE — PROGRESS NOTE ADULT - ASSESSMENT
Assessment:  32 y/o Female S/P right VATS 2 wedge resections mechanical pleurodesis 9/18 for spontaneous pneumothorax    Plan:  F/u AM CXR  Incentive spirometry  Pain control  Possible d/c CT today Assessment:  34 y/o Female S/P right VATS 2 wedge resections mechanical pleurodesis 9/18 for spontaneous pneumothorax    Plan:  F/u AM CXR  Incentive spirometry  Pain control  Possible d/c CT today

## 2018-09-22 NOTE — PROGRESS NOTE ADULT - SUBJECTIVE AND OBJECTIVE BOX
EDITH CAPONE  33y Female   [id number]    Procedure/Injury: s/p Right vats wedge x2 with mechanical pleurodesis   Events over 24h: Patient complaining of cough overnight. CT still to suction no air leak.     PAST MEDICAL & SURGICAL HISTORY:  Hypothyroid  H/O: : x2    Vital Signs Last 24 Hrs  T(C): 36.9 (21 Sep 2018 20:47), Max: 36.9 (21 Sep 2018 20:47)  T(F): 98.5 (21 Sep 2018 20:47), Max: 98.5 (21 Sep 2018 20:47)  HR: 94 (21 Sep 2018 20:47) (81 - 94)  BP: 116/68 (21 Sep 2018 20:47) (111/69 - 116/68)  BP(mean): --  RR: 18 (21 Sep 2018 20:47) (18 - 18)  SpO2: 99% (21 Sep 2018 19:31) (99% - 99%)    Diet, Regular (18 @ 18:36)    I&O's Detail    20 Sep 2018 07:01  -  21 Sep 2018 07:00  --------------------------------------------------------  IN:  Total IN: 0 mL    OUT:    Chest Tube: 20 mL    Voided: 520 mL  Total OUT: 540 mL    Total NET: -540 mL    MEDICATIONS  (STANDING):  chlorhexidine 4% Liquid 1 Application(s) Topical <User Schedule>  docusate sodium 100 milliGRAM(s) Oral two times a day  heparin  Injectable 5000 Unit(s) SubCutaneous every 12 hours  ketorolac   Injectable 15 milliGRAM(s) IV Push every 6 hours  levothyroxine 100 MICROGram(s) Oral daily  pantoprazole    Tablet 40 milliGRAM(s) Oral before breakfast  senna 1 Tablet(s) Oral daily    MEDICATIONS  (PRN):  acetaminophen 300 mG/codeine 30 mG 1 Tablet(s) Oral every 4 hours PRN Mild Pain (1 - 3)  bisacodyl Suppository 10 milliGRAM(s) Rectal daily PRN Constipation  diazepam    Tablet 2 milliGRAM(s) Oral every 8 hours PRN muscle spasm  lactulose Syrup 15 Gram(s) Oral daily PRN constipation  naloxone Injectable 0.1 milliGRAM(s) IV Push every 3 minutes PRN For ANY of the following changes in patient status:  A. RR LESS THAN 10 breaths per minute, B. Oxygen saturation LESS THAN 90%, C. Sedation score of 6  ondansetron Injectable 4 milliGRAM(s) IV Push every 6 hours PRN Nausea    PHYSICAL EXAM:  GENERAL: NAD, well-developed  CHEST/LUNG: Clear to auscultation bilaterally; No wheezing, ronchi, rales.  HEART: Regular rate and rhythm; No murmurs, rubs, or gallops  ABDOMEN: Soft, Nontender, Nondistended;   EXTREMITIES: No clubbing, cyanosis, or edema  PSYCH: AAOx3  NEUROLOGY: non-focal deficits  SKIN: No rashes or lesions       LABS:                    11.0   5.79  )-----------( 218      ( 21 Sep 2018 02:29 )             34.4            139  |  101  |  9<L>  ----------------------------<  100<H>  4.5   |  24  |  0.6<L>    Ca    9.3      21 Sep 2018 02:29  Phos  3.5       Mg     1.9         IMAGING:  Xray Chest 1 View-PORTABLE IMMEDIATE (18 @ 02:43) >  Impression:    Right-sided apical chest tube, with small residual pneumothorax.. EDITH CAPONE  33y Female   [id number]    Procedure/Injury: s/p Right vats wedge x2 with mechanical pleurodesis   Events over 24h: Patient complaining of cough overnight. CT still to suction no air leak. she has had small BM after supository    PAST MEDICAL & SURGICAL HISTORY:  Hypothyroid  H/O: : x2    Vital Signs Last 24 Hrs  T(C): 36.9 (21 Sep 2018 20:47), Max: 36.9 (21 Sep 2018 20:47)  T(F): 98.5 (21 Sep 2018 20:47), Max: 98.5 (21 Sep 2018 20:47)  HR: 94 (21 Sep 2018 20:47) (81 - 94)  BP: 116/68 (21 Sep 2018 20:47) (111/69 - 116/68)  BP(mean): --  RR: 18 (21 Sep 2018 20:47) (18 - 18)  SpO2: 99% (21 Sep 2018 19:31) (99% - 99%)    Diet, Regular (18 @ 18:36)    I&O's Detail    20 Sep 2018 07:01  -  21 Sep 2018 07:00  --------------------------------------------------------  IN:  Total IN: 0 mL    OUT:    Chest Tube: 20 mL    Voided: 520 mL  Total OUT: 540 mL    Total NET: -540 mL    MEDICATIONS  (STANDING):  chlorhexidine 4% Liquid 1 Application(s) Topical <User Schedule>  docusate sodium 100 milliGRAM(s) Oral two times a day  heparin  Injectable 5000 Unit(s) SubCutaneous every 12 hours  ketorolac   Injectable 15 milliGRAM(s) IV Push every 6 hours  levothyroxine 100 MICROGram(s) Oral daily  pantoprazole    Tablet 40 milliGRAM(s) Oral before breakfast  senna 1 Tablet(s) Oral daily    MEDICATIONS  (PRN):  acetaminophen 300 mG/codeine 30 mG 1 Tablet(s) Oral every 4 hours PRN Mild Pain (1 - 3)  bisacodyl Suppository 10 milliGRAM(s) Rectal daily PRN Constipation  diazepam    Tablet 2 milliGRAM(s) Oral every 8 hours PRN muscle spasm  lactulose Syrup 15 Gram(s) Oral daily PRN constipation  naloxone Injectable 0.1 milliGRAM(s) IV Push every 3 minutes PRN For ANY of the following changes in patient status:  A. RR LESS THAN 10 breaths per minute, B. Oxygen saturation LESS THAN 90%, C. Sedation score of 6  ondansetron Injectable 4 milliGRAM(s) IV Push every 6 hours PRN Nausea    PHYSICAL EXAM:  GENERAL: NAD, well-developed  CHEST/LUNG: Clear to auscultation bilaterally; No wheezing, ronchi, rales.  HEART: Regular rate and rhythm; No murmurs, rubs, or gallops  ABDOMEN: Soft, Nontender, Nondistended;   EXTREMITIES: No clubbing, cyanosis, or edema  PSYCH: AAOx3  NEUROLOGY: non-focal deficits  SKIN: No rashes or lesions       LABS:                    11.0   5.79  )-----------( 218      ( 21 Sep 2018 02:29 )             34.4            139  |  101  |  9<L>  ----------------------------<  100<H>  4.5   |  24  |  0.6<L>    Ca    9.3      21 Sep 2018 02:29  Phos  3.5       Mg     1.9         IMAGING:  Xray Chest 1 View-PORTABLE IMMEDIATE (18 @ 02:43) >  Impression:    Right-sided apical chest tube, with small residual pneumothorax..

## 2018-09-23 ENCOUNTER — TRANSCRIPTION ENCOUNTER (OUTPATIENT)
Age: 33
End: 2018-09-23

## 2018-09-23 VITALS — SYSTOLIC BLOOD PRESSURE: 98 MMHG | DIASTOLIC BLOOD PRESSURE: 56 MMHG | TEMPERATURE: 96 F | HEART RATE: 94 BPM

## 2018-09-23 RX ORDER — ACETAMINOPHEN 500 MG
650 TABLET ORAL EVERY 6 HOURS
Qty: 0 | Refills: 0 | Status: DISCONTINUED | OUTPATIENT
Start: 2018-09-23 | End: 2018-09-23

## 2018-09-23 RX ADMIN — CHLORHEXIDINE GLUCONATE 1 APPLICATION(S): 213 SOLUTION TOPICAL at 05:19

## 2018-09-23 RX ADMIN — SENNA PLUS 1 TABLET(S): 8.6 TABLET ORAL at 12:05

## 2018-09-23 RX ADMIN — Medication 650 MILLIGRAM(S): at 06:11

## 2018-09-23 RX ADMIN — Medication 650 MILLIGRAM(S): at 05:33

## 2018-09-23 RX ADMIN — Medication 100 MICROGRAM(S): at 05:19

## 2018-09-23 RX ADMIN — Medication 100 MILLIGRAM(S): at 05:19

## 2018-09-23 NOTE — PROGRESS NOTE ADULT - REASON FOR ADMISSION
Pneumothorax

## 2018-09-23 NOTE — PROGRESS NOTE ADULT - SUBJECTIVE AND OBJECTIVE BOX
GENERAL SURGERY PROGRESS NOTE    Patient: EDITH CAPONE , 33y (85)Female   MRN: [id number]  Location: [address]  Visit: 18 Inpatient  Date: 18 @ 03:06    Hospital Day #: 10  Post-Op Day #: 5    Procedure/Dx/Injuries: s/p right VATS w/ 2 wedge resections & mechanical pleurodesis    Events of past 24 hours: Chest tube was pulled, CXR was obtained and showed trace R apical pneumothorax. Pt denies CP, SOB, n/v, fever/chills, abd pain. Pt states she feels some tingling at site where CT was pulled as well as some abdominal bloating, but otherwise feeling good w/o complaints. No acute events overnight.  Pt pulling 1500 on IS    PAST MEDICAL & SURGICAL HISTORY:  Hypothyroid  H/O: : x2      Vitals: T(F): 96.9 (18 @ 20:44), Max: 98.2 (18 @ 13:44)  HR: 84 (18 @ 20:44)  BP: 109/66 (18 @ 20:44)  RR: 18 (18 @ 20:44)  SpO2: --      Diet, Regular      Fluids:     I & O's:    18 @ 07:01  -  18 @ 07:00  --------------------------------------------------------  IN:  Total IN: 0 mL    OUT:  Total OUT: 0 mL    Total NET: 0 mL            PHYSICAL EXAM  GEN: NAD, resting comfortably  CV: RRR, no rubs/murmurs  LUNGS: CTAB, no wheeze/rales/ronchi  ABD: soft, nt, nd, no guarding, +BS  EXT: FROM, no clubbing/cyanosis  WOUND/INCISION: R chest wound c/d/i w/ foam tape over site        MEDICATIONS  (STANDING):  chlorhexidine 4% Liquid 1 Application(s) Topical <User Schedule>  docusate sodium 100 milliGRAM(s) Oral two times a day  heparin  Injectable 5000 Unit(s) SubCutaneous every 12 hours  ketorolac   Injectable 15 milliGRAM(s) IV Push every 6 hours  levothyroxine 100 MICROGram(s) Oral daily  pantoprazole    Tablet 40 milliGRAM(s) Oral before breakfast  senna 1 Tablet(s) Oral daily    MEDICATIONS  (PRN):  acetaminophen 300 mG/codeine 30 mG 1 Tablet(s) Oral every 4 hours PRN Mild Pain (1 - 3)  bisacodyl Suppository 10 milliGRAM(s) Rectal daily PRN Constipation  diazepam    Tablet 2 milliGRAM(s) Oral every 8 hours PRN muscle spasm  lactulose Syrup 15 Gram(s) Oral daily PRN constipation  naloxone Injectable 0.1 milliGRAM(s) IV Push every 3 minutes PRN For ANY of the following changes in patient status:  A. RR LESS THAN 10 breaths per minute, B. Oxygen saturation LESS THAN 90%, C. Sedation score of 6  ondansetron Injectable 4 milliGRAM(s) IV Push every 6 hours PRN Nausea

## 2018-09-23 NOTE — DISCHARGE NOTE ADULT - ADDITIONAL INSTRUCTIONS
Please continue your home medications, you may take percocet as needed for moderate pain, a prescription has been sent to your pharmacy.  Please follow up with Dr. Zamorano in 1 week, you may call [phone] to make an appointment.  Please continue to use your incentive spirometer 10x per hour for the next 4-6 weeks.  You may continue daily activities as tolerated however please refrain from heavy lifting >15 lbs for at least 2 weeks.  You may drive when you feel comfortable however please refrain from driving if you are taking percocet for pain.  You may shower, please keep dressings and wounds clean and dry.  Please do not soak dressings or wounds and please refrain from swimming, hot tubs, and baths for 4 weeks.

## 2018-09-23 NOTE — DISCHARGE NOTE ADULT - MEDICATION SUMMARY - MEDICATIONS TO TAKE
I will START or STAY ON the medications listed below when I get home from the hospital:    Percocet 5/325 oral tablet  -- 1 tab(s) by mouth every 6 hours, As Needed -for moderate pain MDD:MDD=3   -- Caution federal law prohibits the transfer of this drug to any person other  than the person for whom it was prescribed.  May cause drowsiness.  Alcohol may intensify this effect.  Use care when operating dangerous machinery.  This prescription cannot be refilled.  This product contains acetaminophen.  Do not use  with any other product containing acetaminophen to prevent possible liver damage.  Using more of this medication than prescribed may cause serious breathing problems.    -- Indication: For moderate pain    LEVOTHYROXINE SODIUM 100 MCG TABS  -- Indication: For Home med

## 2018-09-23 NOTE — DISCHARGE NOTE ADULT - PLAN OF CARE
Complete recovery expected Please continue your home medications, you may take percocet as needed for moderate pain, a prescription has been sent to your pharmacy.  Please follow up with Dr. Zamorano in 1 week, you may call [phone] to make an appointment.  Please continue to use your incentive spirometer 10x per hour for the next 4-6 weeks.  You may continue daily activities as tolerated however please refrain from heavy lifting >15 lbs for at least 2 weeks.  You may drive when you feel comfortable however please refrain from driving if you are taking percocet for pain.  You may shower, please keep dressings and wounds clean and dry.  Please do not soak dressings or wounds and please refrain from swimming, hot tubs, and baths for 4 weeks.

## 2018-09-23 NOTE — DISCHARGE NOTE ADULT - PATIENT PORTAL LINK FT
You can access the [facility], offered by [facility], by registering with the following website: [email or web address]

## 2018-09-23 NOTE — PROGRESS NOTE ADULT - ASSESSMENT
32 y/o Female S/P right VATS 2 wedge resections mechanical pleurodesis 9/18 for spontaneous pneumothorax    PLAN:  - f/u AM CXR  - pain control  - incentive spirometry

## 2018-09-23 NOTE — DISCHARGE NOTE ADULT - CARE PROVIDER_API CALL
Cristian Zamorano), Surgery; Thoracic Surgery  [address] [place] [address]  Phone: [phone]  Fax: [phone]

## 2018-09-23 NOTE — DISCHARGE NOTE ADULT - CARE PLAN
Principal Discharge DX:	Pneumothorax on right  Goal:	Complete recovery expected  Assessment and plan of treatment:	Please continue your home medications, you may take percocet as needed for moderate pain, a prescription has been sent to your pharmacy.  Please follow up with Dr. Zamorano in 1 week, you may call [phone] to make an appointment.  Please continue to use your incentive spirometer 10x per hour for the next 4-6 weeks.  You may continue daily activities as tolerated however please refrain from heavy lifting >15 lbs for at least 2 weeks.  You may drive when you feel comfortable however please refrain from driving if you are taking percocet for pain.  You may shower, please keep dressings and wounds clean and dry.  Please do not soak dressings or wounds and please refrain from swimming, hot tubs, and baths for 4 weeks.

## 2018-09-23 NOTE — DISCHARGE NOTE ADULT - HOSPITAL COURSE
Patient is a 33 year old female with PMH significant for hypothyroidism who presented to the ED s/p complaining of 2 days of chest pain.  Patient stated that she was in her car driving and began to feel chest pain that persisted and SOB that progressed.  In the ED patient was found to have a large right pneumothorax on CXR.  Patient was admitted and pigtail was placed, patient remained stable on the floor.  Patient was complaining of persistent chest pain, taken to OR for VATS where multiple small blebs were found, pleurodesis was performed, chest tube remained in place.  Patient tolerated procedure well.  Chest tube was removed on POD 3, remained stable on the floor, complained only of mild right sided chest pain.  Patient is currently tolerating diet, afebrile, vitally stable, ambulating, voiding with no issues.  Patient will follow up with Dr. Zamorano outpatient in 1 week.

## 2018-09-27 DIAGNOSIS — J93.11 PRIMARY SPONTANEOUS PNEUMOTHORAX: ICD-10-CM

## 2018-09-27 DIAGNOSIS — E03.9 HYPOTHYROIDISM, UNSPECIFIED: ICD-10-CM

## 2018-09-27 DIAGNOSIS — J43.9 EMPHYSEMA, UNSPECIFIED: ICD-10-CM

## 2018-09-28 ENCOUNTER — OUTPATIENT (OUTPATIENT)
Dept: OUTPATIENT SERVICES | Facility: HOSPITAL | Age: 33
LOS: 1 days | Discharge: HOME | End: 2018-09-28

## 2018-09-28 DIAGNOSIS — Z98.891 HISTORY OF UTERINE SCAR FROM PREVIOUS SURGERY: Chronic | ICD-10-CM

## 2018-09-28 DIAGNOSIS — J93.0 SPONTANEOUS TENSION PNEUMOTHORAX: ICD-10-CM

## 2018-10-15 PROBLEM — Z00.00 ENCOUNTER FOR PREVENTIVE HEALTH EXAMINATION: Status: ACTIVE | Noted: 2018-10-15

## 2018-10-19 ENCOUNTER — APPOINTMENT (OUTPATIENT)
Dept: CARDIOTHORACIC SURGERY | Facility: CLINIC | Age: 33
End: 2018-10-19

## 2018-10-19 ENCOUNTER — OUTPATIENT (OUTPATIENT)
Dept: OUTPATIENT SERVICES | Facility: HOSPITAL | Age: 33
LOS: 1 days | Discharge: HOME | End: 2018-10-19

## 2018-10-19 VITALS
SYSTOLIC BLOOD PRESSURE: 125 MMHG | RESPIRATION RATE: 14 BRPM | DIASTOLIC BLOOD PRESSURE: 85 MMHG | HEART RATE: 88 BPM | OXYGEN SATURATION: 99 %

## 2018-10-19 DIAGNOSIS — R20.0 ANESTHESIA OF SKIN: ICD-10-CM

## 2018-10-19 DIAGNOSIS — J93.9 PNEUMOTHORAX, UNSPECIFIED: ICD-10-CM

## 2018-10-19 DIAGNOSIS — R20.2 PARESTHESIA OF SKIN: ICD-10-CM

## 2018-10-19 DIAGNOSIS — Z78.9 OTHER SPECIFIED HEALTH STATUS: ICD-10-CM

## 2018-10-19 DIAGNOSIS — Z86.39 PERSONAL HISTORY OF OTHER ENDOCRINE, NUTRITIONAL AND METABOLIC DISEASE: ICD-10-CM

## 2018-10-19 DIAGNOSIS — J93.83 OTHER PNEUMOTHORAX: ICD-10-CM

## 2018-10-19 DIAGNOSIS — Z98.891 HISTORY OF UTERINE SCAR FROM PREVIOUS SURGERY: Chronic | ICD-10-CM

## 2018-10-19 RX ORDER — GABAPENTIN 300 MG/1
300 CAPSULE ORAL 3 TIMES DAILY
Qty: 135 | Refills: 0 | Status: ACTIVE | COMMUNITY
Start: 2018-10-19 | End: 1900-01-01

## 2018-10-19 RX ORDER — LEVOTHYROXINE SODIUM 0.1 MG/1
100 TABLET ORAL
Refills: 0 | Status: ACTIVE | COMMUNITY

## 2018-10-22 LAB — TSH SERPL-ACNC: 0.91 UIU/ML

## 2018-12-21 ENCOUNTER — OUTPATIENT (OUTPATIENT)
Dept: OUTPATIENT SERVICES | Facility: HOSPITAL | Age: 33
LOS: 1 days | Discharge: HOME | End: 2018-12-21

## 2018-12-21 DIAGNOSIS — R06.02 SHORTNESS OF BREATH: ICD-10-CM

## 2018-12-21 DIAGNOSIS — R07.9 CHEST PAIN, UNSPECIFIED: ICD-10-CM

## 2018-12-21 DIAGNOSIS — Z98.891 HISTORY OF UTERINE SCAR FROM PREVIOUS SURGERY: Chronic | ICD-10-CM

## 2019-04-16 ENCOUNTER — OUTPATIENT (OUTPATIENT)
Dept: OUTPATIENT SERVICES | Facility: HOSPITAL | Age: 34
LOS: 1 days | Discharge: HOME | End: 2019-04-16
Payer: COMMERCIAL

## 2019-04-16 DIAGNOSIS — Z98.891 HISTORY OF UTERINE SCAR FROM PREVIOUS SURGERY: Chronic | ICD-10-CM

## 2019-04-16 DIAGNOSIS — R05 COUGH: ICD-10-CM

## 2019-04-16 PROCEDURE — 71046 X-RAY EXAM CHEST 2 VIEWS: CPT | Mod: 26

## 2019-10-24 ENCOUNTER — RX RENEWAL (OUTPATIENT)
Age: 34
End: 2019-10-24

## 2020-08-13 ENCOUNTER — OUTPATIENT (OUTPATIENT)
Dept: OUTPATIENT SERVICES | Facility: HOSPITAL | Age: 35
LOS: 1 days | Discharge: HOME | End: 2020-08-13
Payer: COMMERCIAL

## 2020-08-13 DIAGNOSIS — Z98.891 HISTORY OF UTERINE SCAR FROM PREVIOUS SURGERY: Chronic | ICD-10-CM

## 2020-08-13 DIAGNOSIS — J84.81 LYMPHANGIOLEIOMYOMATOSIS: ICD-10-CM

## 2020-08-13 PROCEDURE — 71046 X-RAY EXAM CHEST 2 VIEWS: CPT | Mod: 26

## 2021-02-24 ENCOUNTER — OUTPATIENT (OUTPATIENT)
Dept: OUTPATIENT SERVICES | Facility: HOSPITAL | Age: 36
LOS: 1 days | Discharge: HOME | End: 2021-02-24
Payer: COMMERCIAL

## 2021-02-24 DIAGNOSIS — Z98.891 HISTORY OF UTERINE SCAR FROM PREVIOUS SURGERY: Chronic | ICD-10-CM

## 2021-02-24 DIAGNOSIS — J84.81 LYMPHANGIOLEIOMYOMATOSIS: ICD-10-CM

## 2021-02-24 PROCEDURE — 71046 X-RAY EXAM CHEST 2 VIEWS: CPT | Mod: 26

## 2021-08-18 NOTE — PACU DISCHARGE NOTE - THE ANESTHESIA ORDERS USED IN THE PACU ORDER SET WILL BE DISCONTINUED UPON TRANSFER OF THIS PATIENT
8/18/2021    Yohana Falk MD  [address]  [facility] [address]    RE: Mariluz Echeverria       Dear Colleague,    I had the pleasure of seeing Mariluz Echeverria in the [facility].    HPI and Plan:   See dictation    Orders Placed This Encounter   Procedures     Follow-Up with Cardiologist     EKG 12-lead complete w/read - Clinics (to be scheduled)       Orders Placed This Encounter   Medications     amLODIPine (NORVASC) 5 MG tablet     Sig: Take one 5 MG pill by mouth in the morning     Dispense:  90 tablet     Refill:  3     lisinopril-hydrochlorothiazide (ZESTORETIC) 20-12.5 MG tablet     Sig: Take 1 tablet by mouth daily     Dispense:  90 tablet     Refill:  3     metoprolol succinate ER (TOPROL-XL) 50 MG 24 hr tablet     Sig: Take 1 tablet (50 mg) by mouth daily     Dispense:  90 tablet     Refill:  3       Medications Discontinued During This Encounter   Medication Reason     metoprolol succinate ER (TOPROL-XL) 50 MG 24 hr tablet Reorder     lisinopril-hydrochlorothiazide (ZESTORETIC) 20-12.5 MG tablet Reorder     amLODIPine (NORVASC) 5 MG tablet Reorder         Encounter Diagnoses   Name Primary?     Paroxysmal atrial fibrillation (H) Yes     Benign essential hypertension        CURRENT MEDICATIONS:  Current Outpatient Medications   Medication Sig Dispense Refill     amLODIPine (NORVASC) 5 MG tablet Take one 5 MG pill by mouth in the morning 90 tablet 3     Calcium Carbonate-Vitamin D (CALCIUM 600 + D OR) Take 1 tablet by mouth daily       flecainide (TAMBOCOR) 50 MG tablet Take 1 tablet (50 mg) by mouth 2 times daily 180 tablet 3     lidocaine (LMX4) 4 % external cream Apply 1 g topically every 3 hours as needed for pain (of the right great toe) Cancel previous script 45 g 1     lisinopril-hydrochlorothiazide (ZESTORETIC) 20-12.5 MG tablet Take 1 tablet by mouth daily 90 tablet 3     metoprolol succinate ER (TOPROL-XL) 50 MG 24 hr  tablet Take 1 tablet (50 mg) by mouth daily 90 tablet 3     Multiple Vitamins-Minerals (PRESERVISION/LUTEIN PO) Take 2 capsules by mouth daily         ALLERGIES     Allergies   Allergen Reactions     Meperidine Hcl      demerol= nausea       PAST MEDICAL HISTORY:  Past Medical History:   Diagnosis Date     Actinic keratosis     multiple, neris on back      Atrial fibrillation (H)     [facility]     Diffuse cystic mastopathy     has prominent chronic L breast cystic structure     Essential hypertension, benign      First degree AV block 5/9/2016    Per Holter 9/18/15      Generalized osteoarthrosis, unspecified site     hands, L hip     Leiomyoma of uterus, unspecified      S/P AV conner ablation     At [facility], remote history      Unspecified arthropathy, pelvic region and thigh     L hip     Urethral stricture unspecified     inactive       PAST SURGICAL HISTORY:  Past Surgical History:   Procedure Laterality Date     COLONOSCOPY N/A 8/15/2017    Procedure: COMBINED COLONOSCOPY, SINGLE OR MULTIPLE BIOPSY/POLYPECTOMY BY BIOPSY;  COLONOSCOPY WITH POLYPECTOMY USING COLD FORCEPS;  Surgeon: Leonarda Gaona MD;  Location:  GI     s/p AV conner ablation  2007    At [facility], remote history      wisdom teeth       ZZC NONSPECIFIC PROCEDURE      numerous breast aspirations     ZZC NONSPECIFIC PROCEDURE      T&A     ZZC NONSPECIFIC PROCEDURE      left breast bx     ZZC NONSPECIFIC PROCEDURE      numerous breast aspirations     ZZC NONSPECIFIC PROCEDURE      tubal lig (remote)       FAMILY HISTORY:  Family History   Problem Relation Age of Onset     Arthritis Mother      Neurologic Disorder Mother         parkinsons     Hypertension Mother      Hypertension Father      Cerebrovascular Disease Father         2     Heart Disease Father         mi x 2     Gastrointestinal Disease Father         bleeding ulcers     Cancer Father         prostate     Hypertension Brother      Breast Cancer Maternal Grandmother         SOCIAL HISTORY:  Social History     Socioeconomic History     Marital status:      Spouse name: Jere     Number of children: 3     Years of education: None     Highest education level: None   Occupational History     Occupation: volunteer     Comment: president of  auxiliary   Tobacco Use     Smoking status: Former Smoker     Types: Cigarettes     Quit date:      Years since quittin.6     Smokeless tobacco: Never Used   Substance and Sexual Activity     Alcohol use: Yes     Alcohol/week: 0.0 standard drinks     Comment: 1 glass wine per day     Drug use: No     Sexual activity: Yes     Partners: Male     Birth control/protection: Post-menopausal   Other Topics Concern     Parent/sibling w/ CABG, MI or angioplasty before 65F 55M? Not Asked      Service Not Asked     Blood Transfusions Not Asked     Caffeine Concern No     Comment: none     Occupational Exposure Not Asked     Hobby Hazards Not Asked     Sleep Concern Not Asked     Stress Concern Not Asked     Weight Concern Not Asked     Special Diet Not Asked     Back Care Not Asked     Exercise Yes     Comment: 4-5 a week. bike ,walk     Bike Helmet Not Asked     Seat Belt Not Asked     Self-Exams Not Asked   Social History Narrative     None     Social Determinants of Health     Financial Resource Strain:      Difficulty of Paying Living Expenses:    Food Insecurity:      Worried About Running Out of Food in the Last Year:      Ran Out of Food in the Last Year:    Transportation Needs:      Lack of Transportation (Medical):      Lack of Transportation (Non-Medical):    Physical Activity:      Days of Exercise per Week:      Minutes of Exercise per Session:    Stress:      Feeling of Stress :    Social Connections:      Frequency of Communication with Friends and Family:      Frequency of Social Gatherings with Friends and Family:      Attends [employer] Services:      Active Member of Clubs or Organizations:      Attends Club or  "Organization Meetings:      Marital Status:    Intimate Partner Violence:      Fear of Current or Ex-Partner:      Emotionally Abused:      Physically Abused:      Sexually Abused:        Review of Systems:  Skin:  Negative       Eyes:  Positive for glasses    ENT:  Positive for hearing loss    Respiratory:  Negative       Cardiovascular:  Negative      Gastroenterology: Negative      Genitourinary:  Negative      Musculoskeletal:  Positive for arthritis    Neurologic:  Positive for numbness or tingling of feet right  Psychiatric:  Negative      Heme/Lymph/Imm:  Negative      Endocrine:  Positive for hot flashes;night sweats      Physical Exam:  Vitals: /68   Pulse 58   Ht 1.588 m (5' 2.5\")   Wt 56.8 kg (125 lb 4.8 oz)   SpO2 97%   BMI 22.55 kg/m      Constitutional:  cooperative, alert and oriented, well developed, well nourished, in no acute distress        Skin:  warm and dry to the touch, no apparent skin lesions or masses noted          Head:  normocephalic, no masses or lesions        Eyes:           Lymph:      ENT:  no pallor or cyanosis, dentition good        Neck:           Respiratory:  normal breath sounds, clear to auscultation, normal A-P diameter, normal symmetry, normal respiratory excursion, no use of accessory muscles         Cardiac: regular rhythm, normal S1/S2, no S3 or S4, apical impulse not displaced, no murmurs, gallops or rubs                pulses full and equal, no bruits auscultated                                        GI:  abdomen soft, non-tender, BS normoactive, no mass, no HSM, no bruits        Extremities and Muscular Skeletal:  no deformities, clubbing, cyanosis, erythema observed;no edema              Neurological:           Psych:           CC  No referring provider defined for this encounter.                  Thank you for allowing me to participate in the care of your patient.      Sincerely,     DIAZ GONZALEZ MD     [facility] " [facility]  cc:   No referring provider defined for this encounter.         Statement Selected

## 2022-11-01 ENCOUNTER — OUTPATIENT (OUTPATIENT)
Dept: OUTPATIENT SERVICES | Facility: HOSPITAL | Age: 37
LOS: 1 days | Discharge: HOME | End: 2022-11-01

## 2022-11-01 VITALS
HEART RATE: 80 BPM | OXYGEN SATURATION: 100 % | WEIGHT: 134.92 LBS | TEMPERATURE: 97 F | HEIGHT: 66 IN | RESPIRATION RATE: 16 BRPM | SYSTOLIC BLOOD PRESSURE: 136 MMHG | DIASTOLIC BLOOD PRESSURE: 72 MMHG

## 2022-11-01 DIAGNOSIS — Z01.818 ENCOUNTER FOR OTHER PREPROCEDURAL EXAMINATION: ICD-10-CM

## 2022-11-01 DIAGNOSIS — Z98.891 HISTORY OF UTERINE SCAR FROM PREVIOUS SURGERY: Chronic | ICD-10-CM

## 2022-11-01 DIAGNOSIS — Z98.890 OTHER SPECIFIED POSTPROCEDURAL STATES: Chronic | ICD-10-CM

## 2022-11-01 DIAGNOSIS — N92.0 EXCESSIVE AND FREQUENT MENSTRUATION WITH REGULAR CYCLE: ICD-10-CM

## 2022-11-01 LAB
ALBUMIN SERPL ELPH-MCNC: 5.1 G/DL — SIGNIFICANT CHANGE UP (ref 3.5–5.2)
ALP SERPL-CCNC: 58 U/L — SIGNIFICANT CHANGE UP (ref 30–115)
ALT FLD-CCNC: 13 U/L — SIGNIFICANT CHANGE UP (ref 0–41)
ANION GAP SERPL CALC-SCNC: 10 MMOL/L — SIGNIFICANT CHANGE UP (ref 7–14)
APTT BLD: 32.8 SEC — SIGNIFICANT CHANGE UP (ref 27–39.2)
AST SERPL-CCNC: 19 U/L — SIGNIFICANT CHANGE UP (ref 0–41)
BASOPHILS # BLD AUTO: 0.04 K/UL — SIGNIFICANT CHANGE UP (ref 0–0.2)
BASOPHILS NFR BLD AUTO: 0.8 % — SIGNIFICANT CHANGE UP (ref 0–1)
BILIRUB SERPL-MCNC: 0.3 MG/DL — SIGNIFICANT CHANGE UP (ref 0.2–1.2)
BUN SERPL-MCNC: 12 MG/DL — SIGNIFICANT CHANGE UP (ref 10–20)
CALCIUM SERPL-MCNC: 9.8 MG/DL — SIGNIFICANT CHANGE UP (ref 8.4–10.5)
CHLORIDE SERPL-SCNC: 100 MMOL/L — SIGNIFICANT CHANGE UP (ref 98–110)
CO2 SERPL-SCNC: 26 MMOL/L — SIGNIFICANT CHANGE UP (ref 17–32)
CREAT SERPL-MCNC: 0.6 MG/DL — LOW (ref 0.7–1.5)
EGFR: 118 ML/MIN/1.73M2 — SIGNIFICANT CHANGE UP
EOSINOPHIL # BLD AUTO: 0.05 K/UL — SIGNIFICANT CHANGE UP (ref 0–0.7)
EOSINOPHIL NFR BLD AUTO: 1 % — SIGNIFICANT CHANGE UP (ref 0–8)
GLUCOSE SERPL-MCNC: 80 MG/DL — SIGNIFICANT CHANGE UP (ref 70–99)
HCT VFR BLD CALC: 37.1 % — SIGNIFICANT CHANGE UP (ref 37–47)
HGB BLD-MCNC: 12.2 G/DL — SIGNIFICANT CHANGE UP (ref 12–16)
IMM GRANULOCYTES NFR BLD AUTO: 0.2 % — SIGNIFICANT CHANGE UP (ref 0.1–0.3)
INR BLD: 0.99 RATIO — SIGNIFICANT CHANGE UP (ref 0.65–1.3)
LYMPHOCYTES # BLD AUTO: 1.38 K/UL — SIGNIFICANT CHANGE UP (ref 1.2–3.4)
LYMPHOCYTES # BLD AUTO: 28 % — SIGNIFICANT CHANGE UP (ref 20.5–51.1)
MCHC RBC-ENTMCNC: 27.2 PG — SIGNIFICANT CHANGE UP (ref 27–31)
MCHC RBC-ENTMCNC: 32.9 G/DL — SIGNIFICANT CHANGE UP (ref 32–37)
MCV RBC AUTO: 82.6 FL — SIGNIFICANT CHANGE UP (ref 81–99)
MONOCYTES # BLD AUTO: 0.47 K/UL — SIGNIFICANT CHANGE UP (ref 0.1–0.6)
MONOCYTES NFR BLD AUTO: 9.6 % — HIGH (ref 1.7–9.3)
NEUTROPHILS # BLD AUTO: 2.97 K/UL — SIGNIFICANT CHANGE UP (ref 1.4–6.5)
NEUTROPHILS NFR BLD AUTO: 60.4 % — SIGNIFICANT CHANGE UP (ref 42.2–75.2)
NRBC # BLD: 0 /100 WBCS — SIGNIFICANT CHANGE UP (ref 0–0)
PLATELET # BLD AUTO: 266 K/UL — SIGNIFICANT CHANGE UP (ref 130–400)
POTASSIUM SERPL-MCNC: 4.5 MMOL/L — SIGNIFICANT CHANGE UP (ref 3.5–5)
POTASSIUM SERPL-SCNC: 4.5 MMOL/L — SIGNIFICANT CHANGE UP (ref 3.5–5)
PROT SERPL-MCNC: 7.6 G/DL — SIGNIFICANT CHANGE UP (ref 6–8)
PROTHROM AB SERPL-ACNC: 11.3 SEC — SIGNIFICANT CHANGE UP (ref 9.95–12.87)
RBC # BLD: 4.49 M/UL — SIGNIFICANT CHANGE UP (ref 4.2–5.4)
RBC # FLD: 14.1 % — SIGNIFICANT CHANGE UP (ref 11.5–14.5)
SODIUM SERPL-SCNC: 136 MMOL/L — SIGNIFICANT CHANGE UP (ref 135–146)
T3 SERPL-MCNC: 178 NG/DL — SIGNIFICANT CHANGE UP (ref 80–200)
T4 AB SER-ACNC: 4.6 UG/DL — SIGNIFICANT CHANGE UP (ref 4.6–12)
TSH SERPL-MCNC: 5.73 UIU/ML — HIGH (ref 0.27–4.2)
WBC # BLD: 4.92 K/UL — SIGNIFICANT CHANGE UP (ref 4.8–10.8)
WBC # FLD AUTO: 4.92 K/UL — SIGNIFICANT CHANGE UP (ref 4.8–10.8)

## 2022-11-01 PROCEDURE — 93010 ELECTROCARDIOGRAM REPORT: CPT

## 2022-11-01 RX ORDER — LEVOTHYROXINE SODIUM 125 MCG
0 TABLET ORAL
Qty: 90 | Refills: 0 | DISCHARGE

## 2022-11-01 NOTE — H&P PST ADULT - REASON FOR ADMISSION
36 yo female presents for PAST in preparation for hysteroscopy dilation and curettage with possible uterine biopsy on 11/4/2022 under general anesthesia by Dr. Hansen ([facility]).

## 2022-11-01 NOTE — H&P PST ADULT - NSICDXPASTMEDICALHX_GEN_ALL_CORE_FT
PAST MEDICAL HISTORY:  History of pneumothorax x 2    Hypothyroid     Lymphangioleiomyoma      PAST MEDICAL HISTORY:  History of pneumothorax x 2    Hypothyroid (recent med/dose change)    Lymphangioleiomyoma

## 2022-11-01 NOTE — H&P PST ADULT - HISTORY OF PRESENT ILLNESS
Pt states for approx. 17 years she has had heavy menstrual bleeding. States she saturates approx. 1 supper pad and tampon within 1 hour when on menses. Heavy flow has cause anemia and weakness. Tried conservative methods but denies success. Now for listed procedure.    Denies any chest pain, difficulty breathing, SOB, palpitations, dysuria, URI, or any other infections in the last 2 weeks. Denies any recent travel, contact, or exposure to any persons with known or suspected COVID-19. Pt also denies COVID testing within the last 2 weeks. Pt denies receiving the COVID vaccine. Denies any suicidal or homicidal ideations. Pt advised to self quarantine until day of procedure. Exercise tolerance of 3-4 flights of stairs without dyspnea. MAY reviewed with patient. Pt verbalized understanding of all pre-operative instructions.    Anesthesia Alert  NO--Difficult Airway  NO--History of neck surgery or radiation  NO--Limited ROM of neck  NO--History of Malignant hyperthermia  NO--No personal or family history of Pseudocholinesterase deficiency.  NO--Prior Anesthesia Complication  NO--Latex Allergy  NO--Loose teeth  NO--History of Rheumatoid Arthritis  NO--MAY  NO--Bleeding Risk  NO--Other_____

## 2022-11-04 ENCOUNTER — RESULT REVIEW (OUTPATIENT)
Age: 37
End: 2022-11-04

## 2022-11-04 ENCOUNTER — OUTPATIENT (OUTPATIENT)
Dept: OUTPATIENT SERVICES | Facility: HOSPITAL | Age: 37
LOS: 1 days | Discharge: HOME | End: 2022-11-04
Payer: COMMERCIAL

## 2022-11-04 ENCOUNTER — TRANSCRIPTION ENCOUNTER (OUTPATIENT)
Age: 37
End: 2022-11-04

## 2022-11-04 VITALS — DIASTOLIC BLOOD PRESSURE: 80 MMHG | SYSTOLIC BLOOD PRESSURE: 119 MMHG | RESPIRATION RATE: 17 BRPM | HEART RATE: 81 BPM

## 2022-11-04 VITALS
HEART RATE: 84 BPM | WEIGHT: 134.04 LBS | SYSTOLIC BLOOD PRESSURE: 116 MMHG | RESPIRATION RATE: 18 BRPM | HEIGHT: 66 IN | OXYGEN SATURATION: 100 % | TEMPERATURE: 98 F | DIASTOLIC BLOOD PRESSURE: 68 MMHG

## 2022-11-04 DIAGNOSIS — Z98.890 OTHER SPECIFIED POSTPROCEDURAL STATES: Chronic | ICD-10-CM

## 2022-11-04 DIAGNOSIS — Z98.891 HISTORY OF UTERINE SCAR FROM PREVIOUS SURGERY: Chronic | ICD-10-CM

## 2022-11-04 PROCEDURE — 58563 HYSTEROSCOPY ABLATION: CPT

## 2022-11-04 PROCEDURE — 88305 TISSUE EXAM BY PATHOLOGIST: CPT | Mod: 26

## 2022-11-04 RX ORDER — HYDROMORPHONE HYDROCHLORIDE 2 MG/ML
0.5 INJECTION INTRAMUSCULAR; INTRAVENOUS; SUBCUTANEOUS
Refills: 0 | Status: DISCONTINUED | OUTPATIENT
Start: 2022-11-04 | End: 2022-11-04

## 2022-11-04 RX ORDER — ONDANSETRON 8 MG/1
4 TABLET, FILM COATED ORAL ONCE
Refills: 0 | Status: DISCONTINUED | OUTPATIENT
Start: 2022-11-04 | End: 2022-11-19

## 2022-11-04 RX ORDER — ACETAMINOPHEN 500 MG
650 TABLET ORAL ONCE
Refills: 0 | Status: COMPLETED | OUTPATIENT
Start: 2022-11-04 | End: 2022-11-04

## 2022-11-04 RX ORDER — THYROID 120 MG
1 TABLET ORAL
Qty: 0 | Refills: 0 | DISCHARGE

## 2022-11-04 RX ORDER — LIOTHYRONINE SODIUM 25 UG/1
1 TABLET ORAL
Qty: 0 | Refills: 0 | DISCHARGE

## 2022-11-04 RX ORDER — SODIUM CHLORIDE 9 MG/ML
1000 INJECTION, SOLUTION INTRAVENOUS
Refills: 0 | Status: DISCONTINUED | OUTPATIENT
Start: 2022-11-04 | End: 2022-11-19

## 2022-11-04 RX ORDER — MORPHINE SULFATE 50 MG/1
2 CAPSULE, EXTENDED RELEASE ORAL
Refills: 0 | Status: DISCONTINUED | OUTPATIENT
Start: 2022-11-04 | End: 2022-11-04

## 2022-11-04 RX ADMIN — Medication 650 MILLIGRAM(S): at 16:21

## 2022-11-04 NOTE — BRIEF OPERATIVE NOTE - OPERATION/FINDINGS
Retroverted uterus, sounded to 8cm. Normal vaginal and cervical mucosa. Hysteroscopy showing large uterine cavity without evidence of polyps or myomas. Tissue removed with endometrial curettage. Ablation performed with Oodle ablation system. Following ablation, hysteroscopy showing uterine cavity ablated to the fundus.

## 2022-11-04 NOTE — CHART NOTE - NSCHARTNOTEFT_GEN_A_CORE
PACU ANESTHESIA ADMISSION NOTE      Procedure: Hysteroscopy, with dilation and curettage, and endometrial ablation      Post op diagnosis:  Menorrhagia        ____  Intubated  TV:______       Rate: ______      FiO2: ______    __x__  Patent Airway    ___x_  Full return of protective reflexes    _x___  Full recovery from anesthesia / back to baseline     Vitals:   VSS      Mental Status:  __x__ Awake   _x____ Alert   _____ Drowsy   _____ Sedated    Nausea/Vomiting:  __x__ NO  ______Yes,   See Post - Op Orders          Pain Scale (0-10):  __0___    Treatment: x____ None    ____ See Post - Op/PCA Orders    Post - Operative Fluids:   ____ Oral   ___x_ See Post - Op Orders    Plan: Discharge:   __x__Home       _____Floor     _____Critical Care    _____  Other:_________________    Comments:

## 2022-11-04 NOTE — BRIEF OPERATIVE NOTE - NSICDXBRIEFPROCEDURE_GEN_ALL_CORE_FT
PROCEDURES:  Hysteroscopy, with dilation and curettage, and endometrial ablation 04-Nov-2022 15:53:29  Dyana Zuniga

## 2022-11-04 NOTE — ASU DISCHARGE PLAN (ADULT/PEDIATRIC) - CARE PROVIDER_API CALL
Jamaica Hansen)  Obstetrics and Gynecology  [address] [place] [id number]  Phone: [phone]  Fax: [phone]  Follow Up Time:

## 2022-11-04 NOTE — ASU PATIENT PROFILE, ADULT - NSICDXPASTMEDICALHX_GEN_ALL_CORE_FT
PAST MEDICAL HISTORY:  History of pneumothorax x 2    Hypothyroid (recent med/dose change)    Lymphangioleiomyoma

## 2022-11-04 NOTE — ASU PATIENT PROFILE, ADULT - FALL HARM RISK - HARM RISK INTERVENTIONS

## 2022-11-08 LAB — SURGICAL PATHOLOGY STUDY: SIGNIFICANT CHANGE UP

## 2022-11-09 DIAGNOSIS — N92.0 EXCESSIVE AND FREQUENT MENSTRUATION WITH REGULAR CYCLE: ICD-10-CM

## 2022-11-09 DIAGNOSIS — Z79.899 OTHER LONG TERM (CURRENT) DRUG THERAPY: ICD-10-CM

## 2022-11-09 DIAGNOSIS — E03.9 HYPOTHYROIDISM, UNSPECIFIED: ICD-10-CM

## 2022-11-09 DIAGNOSIS — N84.0 POLYP OF CORPUS UTERI: ICD-10-CM

## 2024-08-24 NOTE — ASU PATIENT PROFILE, ADULT - NSICDXPASTSURGICALHX_GEN_ALL_CORE_FT
PAST SURGICAL HISTORY:  H/O:  x 2    History of lung surgery x 2 mechanical pleuradesis     No indicators present